# Patient Record
Sex: FEMALE | Race: WHITE | NOT HISPANIC OR LATINO | Employment: OTHER | URBAN - METROPOLITAN AREA
[De-identification: names, ages, dates, MRNs, and addresses within clinical notes are randomized per-mention and may not be internally consistent; named-entity substitution may affect disease eponyms.]

---

## 2019-07-16 ENCOUNTER — OFFICE VISIT (OUTPATIENT)
Dept: ORTHOPEDIC SURGERY | Facility: CLINIC | Age: 64
End: 2019-07-16

## 2019-07-16 VITALS
SYSTOLIC BLOOD PRESSURE: 185 MMHG | DIASTOLIC BLOOD PRESSURE: 83 MMHG | BODY MASS INDEX: 28.52 KG/M2 | HEART RATE: 73 BPM | WEIGHT: 155 LBS | HEIGHT: 62 IN

## 2019-07-16 DIAGNOSIS — M16.11 PRIMARY OSTEOARTHRITIS OF RIGHT HIP: Primary | ICD-10-CM

## 2019-07-16 PROCEDURE — 99204 OFFICE O/P NEW MOD 45 MIN: CPT | Performed by: ORTHOPAEDIC SURGERY

## 2019-07-16 RX ORDER — MELOXICAM 7.5 MG/1
15 TABLET ORAL ONCE
Status: CANCELLED | OUTPATIENT
Start: 2019-07-16 | End: 2019-07-16

## 2019-07-16 RX ORDER — ESCITALOPRAM OXALATE 10 MG/1
10-15 TABLET ORAL DAILY
Refills: 12 | COMMUNITY
Start: 2019-07-01 | End: 2019-11-15 | Stop reason: SDUPTHER

## 2019-07-16 RX ORDER — BIMATOPROST 0.01 %
DROPS OPHTHALMIC (EYE)
Refills: 6 | COMMUNITY
Start: 2019-06-08

## 2019-07-16 RX ORDER — ACETAMINOPHEN 325 MG/1
1000 TABLET ORAL ONCE
Status: CANCELLED | OUTPATIENT
Start: 2019-07-16 | End: 2019-07-16

## 2019-07-16 RX ORDER — PREGABALIN 150 MG/1
150 CAPSULE ORAL ONCE
Status: CANCELLED | OUTPATIENT
Start: 2019-07-16 | End: 2019-07-16

## 2019-07-16 RX ORDER — SODIUM PHOSPHATE,MONO-DIBASIC 19G-7G/118
ENEMA (ML) RECTAL DAILY
COMMUNITY
End: 2020-11-23

## 2019-07-16 RX ORDER — VITAMIN B COMPLEX
1 CAPSULE ORAL 2 TIMES DAILY
COMMUNITY
End: 2020-11-23

## 2019-07-16 RX ORDER — MULTIVIT WITH MINERALS/LUTEIN
400 TABLET ORAL DAILY
COMMUNITY
End: 2019-08-22 | Stop reason: HOSPADM

## 2019-07-16 RX ORDER — FERROUS SULFATE TAB EC 324 MG (65 MG FE EQUIVALENT) 324 (65 FE) MG
324 TABLET DELAYED RESPONSE ORAL
COMMUNITY
End: 2020-11-23

## 2019-07-16 NOTE — PROGRESS NOTES
NEW VISIT    Patient: Savannah Bruner  ?  YOB: 1955    MRN: [id number]  ?  Chief Complaint   Patient presents with   • Right Hip - Pain      ?  HPI: The patient has been having increasing pain and discomfort in her right hip.  She states that she is here for second opinion.  She was seen by another surgeon about a year ago and was thinking about nonoperative management at that point that she was on the floor at [facility] on the next SIPS unit.  She states that by the end of her shift she is in a lot of pain and discomfort.  She is limping quite significantly.  SHe also reports that she has a limb length discrepancy which makes her low back pain worse.  The patient states that she is unable to work because of her hip pain and now she would like to proceed with surgical intervention to improve her quality of life and also the ability to walk for exercise and cardio-vascular conditioning.  Pain Location: Right hip laterally over the greater trochanter and in the groin.  Radiation: From the lateral aspect of the hip down the lateral aspect of the femur  Quality: sharp and stabbing  Intensity/Severity: 8/10  Duration: For 1 year.  Onset quality: gradual   Timing: constant  Aggravating Factors: Walking on a concrete floor and going up and up steps.  Alleviating Factors: Resting the hip and using intact after a medication for the past 6 months or so.  Previous Episodes: yes  Associated Symptoms: pain, decreased ROM, decreased strength  Previous Treatment: Anti-inflammatory medication for the past 6 months and occasionally using a cane.    This patient is a new patient.  This problem is new to this examiner.      Allergies: No Known Allergies    Medications:   Home Medications:  Current Outpatient Medications on File Prior to Visit   Medication Sig   • B Complex Vitamins (VITAMIN B COMPLEX) capsule capsule Take  by mouth Daily.   • ferrous sulfate 324 (65 Fe) MG tablet delayed-release EC tablet Take 324 mg  "by mouth Daily With Breakfast.   • glucosamine sulfate 500 MG capsule capsule Take  by mouth 3 (Three) Times a Day With Meals.   • vitamin E 1000 UNIT capsule Take 1,000 Units by mouth Daily.   • escitalopram (LEXAPRO) 10 MG tablet Take 10 mg by mouth Daily.   • LUMIGAN 0.01 % ophthalmic drops INSTILL 1 DROP INTO EACH EYE AT BEDTIME     No current facility-administered medications on file prior to visit.      Current Medications:  Scheduled Meds:  PRN Meds:.    I have reviewed the patient's medical history in detail and updated the computerized patient record.  Review and summarization of old records include:    Past Medical History:   Diagnosis Date   • Anxiety    • Right hip pain      Past Surgical History:   Procedure Laterality Date   •  SECTION     • HYSTERECTOMY     • TONSILLECTOMY       Social History     Occupational History   • Not on file   Tobacco Use   • Smoking status: Never Smoker   • Smokeless tobacco: Never Used   Substance and Sexual Activity   • Alcohol use: No     Frequency: Never   • Drug use: No   • Sexual activity: Defer      Social History     Social History Narrative   • Not on file     Family History   Problem Relation Age of Onset   • Cancer Mother    • Diabetes Maternal Grandmother    • Heart disease Maternal Grandmother    • Cancer Maternal Grandmother          Review of Systems   Constitutional: Negative.    HENT: Negative.    Eyes: Negative.    Respiratory: Negative.    Cardiovascular: Negative.    Gastrointestinal: Negative.    Endocrine: Negative.    Genitourinary: Negative.    Musculoskeletal: Positive for back pain and gait problem.   Skin: Negative.    Allergic/Immunologic: Negative.    Hematological: Negative.    Psychiatric/Behavioral: Negative.           Wt Readings from Last 3 Encounters:   19 70.3 kg (155 lb)     Ht Readings from Last 3 Encounters:   19 157.5 cm (62\")     Body mass index is 28.35 kg/m².  Facility age limit for growth percentiles is 20 " years.  Vitals:    07/16/19 1107   BP: (!) 185/83   Pulse: 73         Physical Exam   Constitutional: Patient is oriented to person, place, and time. Appears well-developed and well-nourished.   HENT:   Head: Normocephalic and atraumatic.   Eyes: Conjunctivae and EOM are normal. Pupils are equal, round, and reactive to light.   Cardiovascular: Normal rate, regular rhythm, normal heart sounds and intact distal pulses.   Pulmonary/Chest: Effort normal and breath sounds normal.   Musculoskeletal:   See detailed exam below   Neurological: Alert and oriented to person, place, and time. No sensory deficit. Coordination normal.   Skin: Skin is warm and dry. Capillary refill takes less than 2 seconds. No rash noted. No erythema.   Psychiatric: Patient has a normal mood and affect. Her behavior is normal. Judgment and thought content normal.   Nursing note and vitals reviewed.      Ortho Exam:   Right hip (gtb). Skin and soft tissues over the greater trochanteric bursa are painful and tender for the patient. Neurovascular status is intact. IT band is painful and tender. Cross body adduction bothers the patient significantly. Internal and external rotations bother the patient significantly with tenderness over the greater trochanter. There is no clinical deformity. No shortening. The patient does have a significant limp because by the trochanteric pain caused by the abductors. The piriformis is tight and with any rotation there is significant capsular tightness. Dorsalis pedis and posterior tibial artery pulses are palpable. Capillary refill is 2 seconds with a brisk return. Common peroneal nerve function is well preserved.  Right hip (djd). Neurovascular status is intact. Patient has a distant limp on the affected side. Internal and external rotations are associated with pain and discomfort. Anterior joint line pain and tenderness is significant. Stinchfield sign is positive. Figure of 4 sign is positive. Patient is unable  to perform an active straight leg raise exam. Greater trochanter is tender. Crossover adduction test is positive. Cross body adduction is limited and painful for the patient. Patient has very significant limp and joint line tenderness anteriorly, posteriorly and medially. Dorsalis pedis and posterior tibial artery pulses are palpable. Common peroneal nerve function is well preserved.        Diagnostics: It is obtained the hospital are reviewed.  There is a significant tearing of the hip joint space with subchondral cyst formation is noted.  Patient has bone-on-bone appearance.  The joint space is completely obliterated.  There is a loss of height of the acetabulum.  I recommend she is to proceed with total hip arthroplasty are based on medical correlation along with x-ray confirmation of the patient's diagnosis       Assessment:  Savannah was seen today for pain.    Diagnoses and all orders for this visit:    Primary osteoarthritis of right hip          Procedures  ?    Plan    · Use a cane in the opposite hand.  · Tablet meloxicam 7.5 mg tab 1 p.o. nightly for rectal pain and discomfort.  · Rest, ice, compression, and elevation (RICE) therapy  · Stretching and strengthening exercises of the flexors and abductors including Ativan.  · Recent benefits of hip replacement surgery discussed with the patient and her  in great detail.  · Time spent in the office today with the patient is 45 minutes of which greater than 50% of my time was spent face-to-face with the patient going through the treatment options and the potential comp occasions of anterior approach hip replacement surgery.  The patient was seen today for preoperative discussion.  The patient has been tried on over-the-counter and prescription NSAID's despite the risks of anti-inflammatory bleeding, peptic ulcers and erosive gastritis with short term benefit only.  Braces have been prescribed for mechanical support.  Patient has been participating in an  exercise program specifically targeting joint pain relief with limited benefit. Intraarticular injections have been used periodically with some but not complete relief of pain.  Ambulation aids have also been utilized.      · The details of the surgical procedure were explained including the location of probable incisions and a description of the likely hardware/grafts to be used. The patient understands the likely convalescence after surgery as well as the rehabilitation required.  Also, we have thoroughly discussed with the patient the risks, benefits and alternatives to surgery.  Risks include but are not limited to the risk of infection, joint stiffness, limited range of motion, wound healing problems, scar tissue build up, myocardial infarction, stroke, blood clots (including DVT and/or pulmonary embolus along with the risk of death) neurologic and/or vascular injury, limb length discrepancy, fracture, dislocation, nonunion, malunion, continued pain and need for further surgery including hardware failure requiring revision.   · OTC Tylenol 500-1000mg by mouth every 6 hours as needed for pain   · Follow up in 6 week(s)    Daniel Tolentino MD  07/16/2019

## 2019-08-05 ENCOUNTER — HOSPITAL ENCOUNTER (OUTPATIENT)
Dept: GENERAL RADIOLOGY | Facility: HOSPITAL | Age: 64
Discharge: HOME | End: 2019-08-05

## 2019-08-05 ENCOUNTER — APPOINTMENT (OUTPATIENT)
Dept: PREADMISSION TESTING | Facility: HOSPITAL | Age: 64
End: 2019-08-05

## 2019-08-05 ENCOUNTER — HOSPITAL ENCOUNTER (OUTPATIENT)
Dept: GENERAL RADIOLOGY | Facility: HOSPITAL | Age: 64
Discharge: HOME | End: 2019-08-05
Admitting: ORTHOPAEDIC SURGERY

## 2019-08-05 DIAGNOSIS — M16.11 PRIMARY OSTEOARTHRITIS OF RIGHT HIP: ICD-10-CM

## 2019-08-05 PROCEDURE — 93005 ELECTROCARDIOGRAM TRACING: CPT

## 2019-08-05 PROCEDURE — 73501 X-RAY EXAM HIP UNI 1 VIEW: CPT

## 2019-08-05 PROCEDURE — 71046 X-RAY EXAM CHEST 2 VIEWS: CPT

## 2019-08-05 RX ORDER — IBUPROFEN 400 MG/1
400 TABLET ORAL AS NEEDED
COMMUNITY
End: 2019-08-22 | Stop reason: HOSPADM

## 2019-08-05 ASSESSMENT — HOOS JR
HOOS JR SCORE: 15
HOOS JR SCORE: 43.335

## 2019-08-10 PROCEDURE — 93010 ELECTROCARDIOGRAM REPORT: CPT | Performed by: INTERNAL MEDICINE

## 2019-08-13 ENCOUNTER — LAB (OUTPATIENT)
Dept: LAB | Facility: HOSPITAL | Age: 64
End: 2019-08-13

## 2019-08-13 LAB
ABO GROUP BLD: NORMAL
ANION GAP SERPL CALCULATED.3IONS-SCNC: 13.9 MMOL/L (ref 5–15)
APTT PPP: 26.3 SECONDS (ref 24–31)
BACTERIA UR QL AUTO: ABNORMAL /HPF
BASOPHILS # BLD AUTO: 0 10*3/MM3 (ref 0–0.2)
BASOPHILS NFR BLD AUTO: 0.6 % (ref 0–1.5)
BILIRUB UR QL STRIP: NEGATIVE
BLD GP AB SCN SERPL QL: NEGATIVE
BUN BLD-MCNC: 12 MG/DL (ref 8–20)
BUN/CREAT SERPL: 17.1 (ref 5.4–26.2)
CALCIUM SPEC-SCNC: 9.6 MG/DL (ref 8.9–10.3)
CHLORIDE SERPL-SCNC: 103 MMOL/L (ref 101–111)
CLARITY UR: CLEAR
CO2 SERPL-SCNC: 28 MMOL/L (ref 22–32)
COLOR UR: YELLOW
CREAT BLD-MCNC: 0.7 MG/DL (ref 0.4–1)
DEPRECATED RDW RBC AUTO: 41.6 FL (ref 37–54)
EOSINOPHIL # BLD AUTO: 0.1 10*3/MM3 (ref 0–0.4)
EOSINOPHIL NFR BLD AUTO: 1.3 % (ref 0.3–6.2)
ERYTHROCYTE [DISTWIDTH] IN BLOOD BY AUTOMATED COUNT: 13.3 % (ref 12.3–15.4)
GFR SERPL CREATININE-BSD FRML MDRD: 84 ML/MIN/1.73
GLUCOSE BLD-MCNC: 100 MG/DL (ref 65–99)
GLUCOSE UR STRIP-MCNC: NEGATIVE MG/DL
HCT VFR BLD AUTO: 36.8 % (ref 34–46.6)
HGB BLD-MCNC: 12.3 G/DL (ref 12–15.9)
HGB UR QL STRIP.AUTO: NEGATIVE
HYALINE CASTS UR QL AUTO: ABNORMAL /LPF
INR PPP: 0.94 (ref 0.9–1.1)
KETONES UR QL STRIP: NEGATIVE
LEUKOCYTE ESTERASE UR QL STRIP.AUTO: ABNORMAL
LYMPHOCYTES # BLD AUTO: 1 10*3/MM3 (ref 0.7–3.1)
LYMPHOCYTES NFR BLD AUTO: 19.7 % (ref 19.6–45.3)
MCH RBC QN AUTO: 29.5 PG (ref 26.6–33)
MCHC RBC AUTO-ENTMCNC: 33.5 G/DL (ref 31.5–35.7)
MCV RBC AUTO: 88 FL (ref 79–97)
MONOCYTES # BLD AUTO: 0.3 10*3/MM3 (ref 0.1–0.9)
MONOCYTES NFR BLD AUTO: 6 % (ref 5–12)
NEUTROPHILS # BLD AUTO: 3.6 10*3/MM3 (ref 1.7–7)
NEUTROPHILS NFR BLD AUTO: 72.4 % (ref 42.7–76)
NITRITE UR QL STRIP: NEGATIVE
NRBC BLD AUTO-RTO: 0.1 /100 WBC (ref 0–0.2)
PH UR STRIP.AUTO: 7.5 [PH] (ref 5–8)
PLATELET # BLD AUTO: 232 10*3/MM3 (ref 140–450)
PMV BLD AUTO: 9.5 FL (ref 6–12)
POTASSIUM BLD-SCNC: 3.9 MMOL/L (ref 3.6–5.1)
PROT UR QL STRIP: NEGATIVE
PROTHROMBIN TIME: 9.8 SECONDS (ref 9.6–11.7)
RBC # BLD AUTO: 4.18 10*6/MM3 (ref 3.77–5.28)
RBC # UR: ABNORMAL /HPF
REF LAB TEST METHOD: ABNORMAL
RH BLD: POSITIVE
SODIUM BLD-SCNC: 141 MMOL/L (ref 136–144)
SP GR UR STRIP: 1.01 (ref 1–1.03)
SQUAMOUS #/AREA URNS HPF: ABNORMAL /HPF
T&S EXPIRATION DATE: NORMAL
UROBILINOGEN UR QL STRIP: ABNORMAL
WBC NRBC COR # BLD: 5 10*3/MM3 (ref 3.4–10.8)
WBC UR QL AUTO: ABNORMAL /HPF

## 2019-08-13 PROCEDURE — 85025 COMPLETE CBC W/AUTO DIFF WBC: CPT | Performed by: ORTHOPAEDIC SURGERY

## 2019-08-13 PROCEDURE — 85730 THROMBOPLASTIN TIME PARTIAL: CPT | Performed by: ORTHOPAEDIC SURGERY

## 2019-08-13 PROCEDURE — 80048 BASIC METABOLIC PNL TOTAL CA: CPT | Performed by: ORTHOPAEDIC SURGERY

## 2019-08-13 PROCEDURE — 86900 BLOOD TYPING SEROLOGIC ABO: CPT | Performed by: ORTHOPAEDIC SURGERY

## 2019-08-13 PROCEDURE — 86900 BLOOD TYPING SEROLOGIC ABO: CPT

## 2019-08-13 PROCEDURE — 81001 URINALYSIS AUTO W/SCOPE: CPT

## 2019-08-13 PROCEDURE — 86901 BLOOD TYPING SEROLOGIC RH(D): CPT

## 2019-08-13 PROCEDURE — 87081 CULTURE SCREEN ONLY: CPT | Performed by: ORTHOPAEDIC SURGERY

## 2019-08-13 PROCEDURE — 85610 PROTHROMBIN TIME: CPT | Performed by: ORTHOPAEDIC SURGERY

## 2019-08-13 PROCEDURE — 86901 BLOOD TYPING SEROLOGIC RH(D): CPT | Performed by: ORTHOPAEDIC SURGERY

## 2019-08-13 PROCEDURE — 36415 COLL VENOUS BLD VENIPUNCTURE: CPT

## 2019-08-13 PROCEDURE — 86850 RBC ANTIBODY SCREEN: CPT | Performed by: ORTHOPAEDIC SURGERY

## 2019-08-14 LAB — MRSA SPEC QL CULT: NORMAL

## 2019-08-15 ENCOUNTER — OFFICE VISIT (OUTPATIENT)
Dept: FAMILY MEDICINE CLINIC | Facility: CLINIC | Age: 64
End: 2019-08-15

## 2019-08-15 VITALS
SYSTOLIC BLOOD PRESSURE: 128 MMHG | HEIGHT: 62 IN | BODY MASS INDEX: 26.68 KG/M2 | TEMPERATURE: 98.2 F | WEIGHT: 145 LBS | HEART RATE: 87 BPM | RESPIRATION RATE: 18 BRPM | DIASTOLIC BLOOD PRESSURE: 82 MMHG | OXYGEN SATURATION: 99 %

## 2019-08-15 DIAGNOSIS — Z01.818 ENCOUNTER FOR PREOPERATIVE EXAMINATION FOR GENERAL SURGICAL PROCEDURE: Primary | ICD-10-CM

## 2019-08-15 PROCEDURE — 99396 PREV VISIT EST AGE 40-64: CPT | Performed by: FAMILY MEDICINE

## 2019-08-15 NOTE — PROGRESS NOTES
Subjective   Savannah Bruner is a 64 y.o. female.      is here today for surgery clearance. Scheduled to have total right hip placed on 8/21/19. Seeing .          The following portions of the patient's history were reviewed and updated as appropriate: allergies, current medications, past family history, past medical history, past social history, past surgical history and problem list.    Patient Active Problem List   Diagnosis   • Primary osteoarthritis of right hip   • Polyarthropathy or polyarthritis   • Anxiety   • Chronic angle-closure glaucoma   • Depressive disorder   • Hyperlipidemia   • Hypertension, benign       Current Outpatient Medications on File Prior to Visit   Medication Sig Dispense Refill   • escitalopram (LEXAPRO) 10 MG tablet Take 10-15 mg by mouth Daily.  12   • Ferrous Sulfate (IRON) 28 MG tablet Take  by mouth Daily.     • B Complex Vitamins (VITAMIN B COMPLEX) capsule capsule Take 1 capsule by mouth 2 (Two) Times a Day.     • ferrous sulfate 324 (65 Fe) MG tablet delayed-release EC tablet Take 324 mg by mouth Daily With Breakfast.     • glucosamine sulfate 500 MG capsule capsule Take  by mouth Daily.     • ibuprofen (ADVIL,MOTRIN) 400 MG tablet Take 400 mg by mouth As Needed for Mild Pain .     • LUMIGAN 0.01 % ophthalmic drops INSTILL 1 DROP INTO EACH EYE daily  6   • vitamin E 1000 UNIT capsule Take 400 Units by mouth Daily.       No current facility-administered medications on file prior to visit.        Allergies   Allergen Reactions   • Latex Itching     Hands itch not diagnosed       Review of Systems   Constitutional: Negative for activity change, appetite change, fatigue and fever.   HENT: Negative for ear pain, swollen glands and voice change.    Eyes: Negative for visual disturbance.   Respiratory: Negative for shortness of breath and wheezing.    Cardiovascular: Negative for chest pain and leg swelling.   Gastrointestinal: Negative for abdominal pain, blood in  stool, constipation, diarrhea, nausea and vomiting.   Endocrine: Negative for polydipsia and polyuria.   Genitourinary: Negative for dysuria, frequency and hematuria.   Musculoskeletal: Negative for joint swelling, neck pain and neck stiffness.   Skin: Negative for rash and bruise.   Neurological: Negative for weakness, numbness and headache.   Psychiatric/Behavioral: Negative for suicidal ideas and depressed mood.       Objective   Physical Exam   Constitutional: She is oriented to person, place, and time. She appears well-developed and well-nourished.   HENT:   Head: Normocephalic and atraumatic.   Left Ear: External ear normal.   Nose: Nose normal.   Mouth/Throat: Oropharynx is clear and moist.   Eyes: EOM are normal. Pupils are equal, round, and reactive to light.   Neck: Normal range of motion. Neck supple.   Cardiovascular: Normal rate, regular rhythm and normal heart sounds.   Pulmonary/Chest: Effort normal.   Abdominal: Soft. Bowel sounds are normal.   Musculoskeletal: Normal range of motion.   Neurological: She is alert and oriented to person, place, and time.   Skin: Skin is warm and dry.   Psychiatric: She has a normal mood and affect. Her behavior is normal. Judgment and thought content normal.         Assessment/Plan .  Problem List Items Addressed This Visit     None      Visit Diagnoses     Encounter for preoperative examination for general surgical procedure    -  Primary    Cleared, pt low risk.

## 2019-08-20 ENCOUNTER — ANESTHESIA EVENT (OUTPATIENT)
Dept: PERIOP | Facility: HOSPITAL | Age: 64
End: 2019-08-20

## 2019-08-21 ENCOUNTER — ANESTHESIA (OUTPATIENT)
Dept: PERIOP | Facility: HOSPITAL | Age: 64
End: 2019-08-21

## 2019-08-21 ENCOUNTER — APPOINTMENT (OUTPATIENT)
Dept: GENERAL RADIOLOGY | Facility: HOSPITAL | Age: 64
End: 2019-08-21

## 2019-08-21 ENCOUNTER — HOSPITAL ENCOUNTER (INPATIENT)
Facility: HOSPITAL | Age: 64
LOS: 1 days | Discharge: HOME | End: 2019-08-22
Attending: ORTHOPAEDIC SURGERY | Admitting: ORTHOPAEDIC SURGERY

## 2019-08-21 DIAGNOSIS — M16.11 PRIMARY OSTEOARTHRITIS OF RIGHT HIP: ICD-10-CM

## 2019-08-21 PROBLEM — M17.11 PRIMARY OSTEOARTHRITIS OF RIGHT KNEE: Status: ACTIVE | Noted: 2019-08-21

## 2019-08-21 PROBLEM — E66.3 OVERWEIGHT (BMI 25.0-29.9): Status: ACTIVE | Noted: 2019-08-21

## 2019-08-21 PROCEDURE — 25010000002 FENTANYL CITRATE (PF) 100 MCG/2ML SOLUTION: Performed by: ANESTHESIOLOGY

## 2019-08-21 PROCEDURE — 27130 TOTAL HIP ARTHROPLASTY: CPT | Performed by: ORTHOPAEDIC SURGERY

## 2019-08-21 PROCEDURE — C9290 INJ, BUPIVACAINE LIPOSOME: HCPCS | Performed by: ORTHOPAEDIC SURGERY

## 2019-08-21 PROCEDURE — 25010000002 CEFAZOLIN PER 500 MG: Performed by: ANESTHESIOLOGY

## 2019-08-21 PROCEDURE — 97162 PT EVAL MOD COMPLEX 30 MIN: CPT

## 2019-08-21 PROCEDURE — 25010000002 CEFAZOLIN PER 500 MG: Performed by: PHYSICIAN ASSISTANT

## 2019-08-21 PROCEDURE — C1776 JOINT DEVICE (IMPLANTABLE): HCPCS | Performed by: ORTHOPAEDIC SURGERY

## 2019-08-21 PROCEDURE — 25010000002 PROPOFOL 200 MG/20ML EMULSION: Performed by: ANESTHESIOLOGY

## 2019-08-21 PROCEDURE — 25010000002 ROPIVACAINE PER 1 MG: Performed by: ORTHOPAEDIC SURGERY

## 2019-08-21 PROCEDURE — 73501 X-RAY EXAM HIP UNI 1 VIEW: CPT

## 2019-08-21 PROCEDURE — 25010000002 SUCCINYLCHOLINE PER 20 MG: Performed by: ANESTHESIOLOGY

## 2019-08-21 PROCEDURE — 25010000003 BUPIVACAINE LIPOSOME 1.3 % SUSPENSION: Performed by: ORTHOPAEDIC SURGERY

## 2019-08-21 PROCEDURE — 25010000002 HYDROMORPHONE PER 4 MG: Performed by: ANESTHESIOLOGY

## 2019-08-21 PROCEDURE — 25010000002 PHENYLEPHRINE 10 MG/ML SOLUTION: Performed by: ANESTHESIOLOGY

## 2019-08-21 PROCEDURE — 72170 X-RAY EXAM OF PELVIS: CPT | Performed by: PHYSICIAN ASSISTANT

## 2019-08-21 PROCEDURE — 25010000002 NEOSTIGMINE 10 MG/10ML SOLUTION: Performed by: ANESTHESIOLOGY

## 2019-08-21 PROCEDURE — 25010000002 ONDANSETRON PER 1 MG: Performed by: PHYSICIAN ASSISTANT

## 2019-08-21 PROCEDURE — C1713 ANCHOR/SCREW BN/BN,TIS/BN: HCPCS | Performed by: ORTHOPAEDIC SURGERY

## 2019-08-21 PROCEDURE — 76000 FLUOROSCOPY <1 HR PHYS/QHP: CPT

## 2019-08-21 PROCEDURE — S0260 H&P FOR SURGERY: HCPCS | Performed by: ORTHOPAEDIC SURGERY

## 2019-08-21 PROCEDURE — 25010000002 ONDANSETRON PER 1 MG: Performed by: ANESTHESIOLOGY

## 2019-08-21 PROCEDURE — 25010000002 DEXAMETHASONE PER 1 MG: Performed by: ANESTHESIOLOGY

## 2019-08-21 PROCEDURE — 97116 GAIT TRAINING THERAPY: CPT

## 2019-08-21 PROCEDURE — 0SR904A REPLACEMENT OF RIGHT HIP JOINT WITH CERAMIC ON POLYETHYLENE SYNTHETIC SUBSTITUTE, UNCEMENTED, OPEN APPROACH: ICD-10-PCS | Performed by: ORTHOPAEDIC SURGERY

## 2019-08-21 DEVICE — BIOLOX® DELTA, CERAMIC FEMORAL HEAD, S, Ø 32/-3.5, TAPER 12/14
Type: IMPLANTABLE DEVICE | Site: HIP | Status: FUNCTIONAL
Brand: BIOLOX® DELTA

## 2019-08-21 DEVICE — IMPLANTABLE DEVICE: Type: IMPLANTABLE DEVICE | Site: HIP | Status: FUNCTIONAL

## 2019-08-21 DEVICE — SCRW ACET CORT TRILOGY S/TAP 6.5X20: Type: IMPLANTABLE DEVICE | Site: HIP | Status: FUNCTIONAL

## 2019-08-21 DEVICE — TOTAL HIP PRIMARY: Type: IMPLANTABLE DEVICE | Site: HIP | Status: FUNCTIONAL

## 2019-08-21 DEVICE — AVENIR MÜLLER STEM 2 STANDARD
Type: IMPLANTABLE DEVICE | Site: HIP | Status: FUNCTIONAL
Brand: AVENIR® MÜLLER

## 2019-08-21 DEVICE — CAP HIP TM UPCHRG: Type: IMPLANTABLE DEVICE | Site: HIP | Status: FUNCTIONAL

## 2019-08-21 DEVICE — CAP HIP VE UPCHRG: Type: IMPLANTABLE DEVICE | Site: HIP | Status: FUNCTIONAL

## 2019-08-21 DEVICE — CAP CERAM HD HIP UPCHRG: Type: IMPLANTABLE DEVICE | Site: HIP | Status: FUNCTIONAL

## 2019-08-21 RX ORDER — OXYCODONE HCL 10 MG/1
10 TABLET, FILM COATED, EXTENDED RELEASE ORAL EVERY 12 HOURS SCHEDULED
Status: DISCONTINUED | OUTPATIENT
Start: 2019-08-21 | End: 2019-08-21 | Stop reason: HOSPADM

## 2019-08-21 RX ORDER — OXYCODONE HYDROCHLORIDE 5 MG/1
5 TABLET ORAL EVERY 4 HOURS PRN
Status: DISCONTINUED | OUTPATIENT
Start: 2019-08-21 | End: 2019-08-22 | Stop reason: HOSPADM

## 2019-08-21 RX ORDER — DIPHENHYDRAMINE HCL 25 MG
25 TABLET ORAL EVERY 6 HOURS PRN
Status: DISCONTINUED | OUTPATIENT
Start: 2019-08-21 | End: 2019-08-22 | Stop reason: HOSPADM

## 2019-08-21 RX ORDER — FERROUS SULFATE TAB EC 324 MG (65 MG FE EQUIVALENT) 324 (65 FE) MG
324 TABLET DELAYED RESPONSE ORAL
Status: DISCONTINUED | OUTPATIENT
Start: 2019-08-22 | End: 2019-08-22 | Stop reason: HOSPADM

## 2019-08-21 RX ORDER — TRAMADOL HYDROCHLORIDE 50 MG/1
50 TABLET ORAL EVERY 6 HOURS PRN
Status: DISCONTINUED | OUTPATIENT
Start: 2019-08-21 | End: 2019-08-22 | Stop reason: HOSPADM

## 2019-08-21 RX ORDER — ROCURONIUM BROMIDE 10 MG/ML
INJECTION, SOLUTION INTRAVENOUS AS NEEDED
Status: DISCONTINUED | OUTPATIENT
Start: 2019-08-21 | End: 2019-08-21 | Stop reason: SURG

## 2019-08-21 RX ORDER — SODIUM CHLORIDE 0.9 % (FLUSH) 0.9 %
3-10 SYRINGE (ML) INJECTION AS NEEDED
Status: DISCONTINUED | OUTPATIENT
Start: 2019-08-21 | End: 2019-08-21 | Stop reason: HOSPADM

## 2019-08-21 RX ORDER — PROPOFOL 10 MG/ML
INJECTION, EMULSION INTRAVENOUS AS NEEDED
Status: DISCONTINUED | OUTPATIENT
Start: 2019-08-21 | End: 2019-08-21 | Stop reason: SURG

## 2019-08-21 RX ORDER — SODIUM CHLORIDE 0.9 % (FLUSH) 0.9 %
3 SYRINGE (ML) INJECTION EVERY 12 HOURS SCHEDULED
Status: DISCONTINUED | OUTPATIENT
Start: 2019-08-21 | End: 2019-08-21 | Stop reason: HOSPADM

## 2019-08-21 RX ORDER — MELOXICAM 15 MG/1
15 TABLET ORAL ONCE
Status: COMPLETED | OUTPATIENT
Start: 2019-08-21 | End: 2019-08-21

## 2019-08-21 RX ORDER — NEOSTIGMINE METHYLSULFATE 1 MG/ML
INJECTION, SOLUTION INTRAVENOUS AS NEEDED
Status: DISCONTINUED | OUTPATIENT
Start: 2019-08-21 | End: 2019-08-21 | Stop reason: SURG

## 2019-08-21 RX ORDER — TRANEXAMIC ACID 100 MG/ML
INJECTION, SOLUTION INTRAVENOUS AS NEEDED
Status: DISCONTINUED | OUTPATIENT
Start: 2019-08-21 | End: 2019-08-21 | Stop reason: SURG

## 2019-08-21 RX ORDER — NALOXONE HCL 0.4 MG/ML
0.4 VIAL (ML) INJECTION
Status: DISCONTINUED | OUTPATIENT
Start: 2019-08-21 | End: 2019-08-22 | Stop reason: HOSPADM

## 2019-08-21 RX ORDER — LIDOCAINE HYDROCHLORIDE 10 MG/ML
INJECTION, SOLUTION EPIDURAL; INFILTRATION; INTRACAUDAL; PERINEURAL AS NEEDED
Status: DISCONTINUED | OUTPATIENT
Start: 2019-08-21 | End: 2019-08-21 | Stop reason: SURG

## 2019-08-21 RX ORDER — ONDANSETRON 4 MG/1
4 TABLET, FILM COATED ORAL EVERY 6 HOURS PRN
Status: DISCONTINUED | OUTPATIENT
Start: 2019-08-21 | End: 2019-08-22 | Stop reason: HOSPADM

## 2019-08-21 RX ORDER — ACETAMINOPHEN 500 MG
1000 TABLET ORAL EVERY 8 HOURS
Status: DISCONTINUED | OUTPATIENT
Start: 2019-08-21 | End: 2019-08-22 | Stop reason: HOSPADM

## 2019-08-21 RX ORDER — PROMETHAZINE HYDROCHLORIDE 25 MG/1
12.5 TABLET ORAL EVERY 6 HOURS PRN
Status: DISCONTINUED | OUTPATIENT
Start: 2019-08-21 | End: 2019-08-22 | Stop reason: HOSPADM

## 2019-08-21 RX ORDER — FENTANYL CITRATE 50 UG/ML
INJECTION, SOLUTION INTRAMUSCULAR; INTRAVENOUS AS NEEDED
Status: DISCONTINUED | OUTPATIENT
Start: 2019-08-21 | End: 2019-08-21 | Stop reason: SURG

## 2019-08-21 RX ORDER — HYDROMORPHONE HCL 110MG/55ML
0.5 PATIENT CONTROLLED ANALGESIA SYRINGE INTRAVENOUS
Status: DISCONTINUED | OUTPATIENT
Start: 2019-08-21 | End: 2019-08-21 | Stop reason: HOSPADM

## 2019-08-21 RX ORDER — SODIUM CHLORIDE 9 MG/ML
100 INJECTION, SOLUTION INTRAVENOUS CONTINUOUS
Status: DISCONTINUED | OUTPATIENT
Start: 2019-08-21 | End: 2019-08-22 | Stop reason: HOSPADM

## 2019-08-21 RX ORDER — ESCITALOPRAM OXALATE 10 MG/1
10 TABLET ORAL DAILY
Status: DISCONTINUED | OUTPATIENT
Start: 2019-08-21 | End: 2019-08-22 | Stop reason: HOSPADM

## 2019-08-21 RX ORDER — SODIUM CHLORIDE, SODIUM LACTATE, POTASSIUM CHLORIDE, CALCIUM CHLORIDE 600; 310; 30; 20 MG/100ML; MG/100ML; MG/100ML; MG/100ML
9 INJECTION, SOLUTION INTRAVENOUS CONTINUOUS PRN
Status: DISCONTINUED | OUTPATIENT
Start: 2019-08-21 | End: 2019-08-21 | Stop reason: HOSPADM

## 2019-08-21 RX ORDER — ONDANSETRON 2 MG/ML
4 INJECTION INTRAMUSCULAR; INTRAVENOUS EVERY 6 HOURS PRN
Status: DISCONTINUED | OUTPATIENT
Start: 2019-08-21 | End: 2019-08-22 | Stop reason: HOSPADM

## 2019-08-21 RX ORDER — SUCCINYLCHOLINE CHLORIDE 20 MG/ML
INJECTION INTRAMUSCULAR; INTRAVENOUS AS NEEDED
Status: DISCONTINUED | OUTPATIENT
Start: 2019-08-21 | End: 2019-08-21 | Stop reason: SURG

## 2019-08-21 RX ORDER — GABAPENTIN 300 MG/1
600 CAPSULE ORAL
Status: COMPLETED | OUTPATIENT
Start: 2019-08-21 | End: 2019-08-21

## 2019-08-21 RX ORDER — ROPIVACAINE HYDROCHLORIDE 5 MG/ML
INJECTION, SOLUTION EPIDURAL; INFILTRATION; PERINEURAL AS NEEDED
Status: DISCONTINUED | OUTPATIENT
Start: 2019-08-21 | End: 2019-08-21 | Stop reason: HOSPADM

## 2019-08-21 RX ORDER — ACETAMINOPHEN 500 MG
1000 TABLET ORAL ONCE
Status: COMPLETED | OUTPATIENT
Start: 2019-08-21 | End: 2019-08-21

## 2019-08-21 RX ORDER — LATANOPROST 50 UG/ML
1 SOLUTION/ DROPS OPHTHALMIC NIGHTLY
Status: DISCONTINUED | OUTPATIENT
Start: 2019-08-21 | End: 2019-08-22 | Stop reason: HOSPADM

## 2019-08-21 RX ORDER — PHENYLEPHRINE HYDROCHLORIDE 10 MG/ML
INJECTION INTRAVENOUS AS NEEDED
Status: DISCONTINUED | OUTPATIENT
Start: 2019-08-21 | End: 2019-08-21 | Stop reason: SURG

## 2019-08-21 RX ORDER — ONDANSETRON 2 MG/ML
INJECTION INTRAMUSCULAR; INTRAVENOUS AS NEEDED
Status: DISCONTINUED | OUTPATIENT
Start: 2019-08-21 | End: 2019-08-21 | Stop reason: SURG

## 2019-08-21 RX ORDER — BISACODYL 10 MG
10 SUPPOSITORY, RECTAL RECTAL DAILY PRN
Status: DISCONTINUED | OUTPATIENT
Start: 2019-08-21 | End: 2019-08-22 | Stop reason: HOSPADM

## 2019-08-21 RX ORDER — GLYCOPYRROLATE 0.2 MG/ML
INJECTION INTRAMUSCULAR; INTRAVENOUS AS NEEDED
Status: DISCONTINUED | OUTPATIENT
Start: 2019-08-21 | End: 2019-08-21 | Stop reason: SURG

## 2019-08-21 RX ORDER — DIPHENHYDRAMINE HYDROCHLORIDE 50 MG/ML
25 INJECTION INTRAMUSCULAR; INTRAVENOUS EVERY 6 HOURS PRN
Status: DISCONTINUED | OUTPATIENT
Start: 2019-08-21 | End: 2019-08-22 | Stop reason: HOSPADM

## 2019-08-21 RX ORDER — GABAPENTIN 300 MG/1
300 CAPSULE ORAL NIGHTLY
Status: DISCONTINUED | OUTPATIENT
Start: 2019-08-21 | End: 2019-08-22 | Stop reason: HOSPADM

## 2019-08-21 RX ORDER — SODIUM CHLORIDE, SODIUM LACTATE, POTASSIUM CHLORIDE, CALCIUM CHLORIDE 600; 310; 30; 20 MG/100ML; MG/100ML; MG/100ML; MG/100ML
9 INJECTION, SOLUTION INTRAVENOUS CONTINUOUS PRN
Status: DISCONTINUED | OUTPATIENT
Start: 2019-08-21 | End: 2019-08-21 | Stop reason: SDUPTHER

## 2019-08-21 RX ORDER — MORPHINE SULFATE 4 MG/ML
4 INJECTION, SOLUTION INTRAMUSCULAR; INTRAVENOUS
Status: DISCONTINUED | OUTPATIENT
Start: 2019-08-21 | End: 2019-08-22 | Stop reason: HOSPADM

## 2019-08-21 RX ORDER — OXYCODONE HYDROCHLORIDE 5 MG/1
10 TABLET ORAL EVERY 4 HOURS PRN
Status: DISCONTINUED | OUTPATIENT
Start: 2019-08-21 | End: 2019-08-22 | Stop reason: HOSPADM

## 2019-08-21 RX ORDER — MELOXICAM 15 MG/1
15 TABLET ORAL DAILY
Status: DISCONTINUED | OUTPATIENT
Start: 2019-08-22 | End: 2019-08-22 | Stop reason: HOSPADM

## 2019-08-21 RX ORDER — DOCUSATE SODIUM 100 MG/1
100 CAPSULE, LIQUID FILLED ORAL 2 TIMES DAILY
Status: DISCONTINUED | OUTPATIENT
Start: 2019-08-21 | End: 2019-08-22 | Stop reason: HOSPADM

## 2019-08-21 RX ADMIN — CEFAZOLIN SODIUM 2 G: 10 INJECTION, POWDER, FOR SOLUTION INTRAVENOUS at 19:31

## 2019-08-21 RX ADMIN — DOCUSATE SODIUM 100 MG: 100 CAPSULE, LIQUID FILLED ORAL at 21:52

## 2019-08-21 RX ADMIN — FENTANYL CITRATE 75 MCG: 50 INJECTION, SOLUTION INTRAMUSCULAR; INTRAVENOUS at 10:48

## 2019-08-21 RX ADMIN — NEOSTIGMINE METHYLSULFATE 3 MG: 1 INJECTION, SOLUTION INTRAVENOUS at 11:42

## 2019-08-21 RX ADMIN — SODIUM CHLORIDE 500 ML: 900 INJECTION, SOLUTION INTRAVENOUS at 08:16

## 2019-08-21 RX ADMIN — SODIUM CHLORIDE, SODIUM LACTATE, POTASSIUM CHLORIDE, AND CALCIUM CHLORIDE 9 ML/HR: 600; 310; 30; 20 INJECTION, SOLUTION INTRAVENOUS at 09:45

## 2019-08-21 RX ADMIN — SUCCINYLCHOLINE CHLORIDE 100 MG: 20 INJECTION, SOLUTION INTRAMUSCULAR; INTRAVENOUS at 10:11

## 2019-08-21 RX ADMIN — CEFAZOLIN SODIUM 2 G: 10 INJECTION, POWDER, FOR SOLUTION INTRAVENOUS at 10:15

## 2019-08-21 RX ADMIN — FENTANYL CITRATE 125 MCG: 50 INJECTION, SOLUTION INTRAMUSCULAR; INTRAVENOUS at 10:11

## 2019-08-21 RX ADMIN — RIVAROXABAN 10 MG: 10 TABLET, FILM COATED ORAL at 19:31

## 2019-08-21 RX ADMIN — PHENYLEPHRINE HYDROCHLORIDE 100 MCG: 10 INJECTION INTRAVENOUS at 11:31

## 2019-08-21 RX ADMIN — ONDANSETRON 4 MG: 2 INJECTION INTRAMUSCULAR; INTRAVENOUS at 10:25

## 2019-08-21 RX ADMIN — PHENYLEPHRINE HYDROCHLORIDE 100 MCG: 10 INJECTION INTRAVENOUS at 11:19

## 2019-08-21 RX ADMIN — OXYCODONE HYDROCHLORIDE 10 MG: 10 TABLET, FILM COATED, EXTENDED RELEASE ORAL at 08:15

## 2019-08-21 RX ADMIN — LIDOCAINE HYDROCHLORIDE 50 MG: 10 INJECTION, SOLUTION EPIDURAL; INFILTRATION; INTRACAUDAL; PERINEURAL at 10:11

## 2019-08-21 RX ADMIN — Medication 10 MG: at 10:25

## 2019-08-21 RX ADMIN — HYDROMORPHONE HYDROCHLORIDE 0.5 MG: 2 INJECTION, SOLUTION INTRAMUSCULAR; INTRAVENOUS; SUBCUTANEOUS at 12:51

## 2019-08-21 RX ADMIN — PHENYLEPHRINE HYDROCHLORIDE 200 MCG: 10 INJECTION INTRAVENOUS at 10:27

## 2019-08-21 RX ADMIN — ESCITALOPRAM OXALATE 10 MG: 10 TABLET ORAL at 17:04

## 2019-08-21 RX ADMIN — MELOXICAM 15 MG: 15 TABLET ORAL at 08:16

## 2019-08-21 RX ADMIN — PHENYLEPHRINE HYDROCHLORIDE 200 MCG: 10 INJECTION INTRAVENOUS at 10:37

## 2019-08-21 RX ADMIN — SODIUM CHLORIDE 100 ML/HR: 900 INJECTION, SOLUTION INTRAVENOUS at 17:12

## 2019-08-21 RX ADMIN — PROPOFOL 50 MG: 10 INJECTION, EMULSION INTRAVENOUS at 11:23

## 2019-08-21 RX ADMIN — ACETAMINOPHEN 1000 MG: 500 TABLET, FILM COATED ORAL at 08:16

## 2019-08-21 RX ADMIN — ROCURONIUM BROMIDE 5 MG: 10 INJECTION, SOLUTION INTRAVENOUS at 10:11

## 2019-08-21 RX ADMIN — TRANEXAMIC ACID 1000 MG: 100 INJECTION, SOLUTION INTRAVENOUS at 10:15

## 2019-08-21 RX ADMIN — ROCURONIUM BROMIDE 20 MG: 10 INJECTION, SOLUTION INTRAVENOUS at 10:21

## 2019-08-21 RX ADMIN — OXYCODONE HYDROCHLORIDE 5 MG: 5 TABLET ORAL at 21:52

## 2019-08-21 RX ADMIN — ROCURONIUM BROMIDE 10 MG: 10 INJECTION, SOLUTION INTRAVENOUS at 11:27

## 2019-08-21 RX ADMIN — GABAPENTIN 300 MG: 300 CAPSULE ORAL at 21:52

## 2019-08-21 RX ADMIN — ONDANSETRON 4 MG: 2 INJECTION INTRAMUSCULAR; INTRAVENOUS at 16:17

## 2019-08-21 RX ADMIN — POLYETHYLENE GLYCOL 3350 17 G: 17 POWDER, FOR SOLUTION ORAL at 17:04

## 2019-08-21 RX ADMIN — PROPOFOL 150 MG: 10 INJECTION, EMULSION INTRAVENOUS at 10:11

## 2019-08-21 RX ADMIN — GABAPENTIN 600 MG: 300 CAPSULE ORAL at 08:15

## 2019-08-21 RX ADMIN — SODIUM CHLORIDE, SODIUM LACTATE, POTASSIUM CHLORIDE, AND CALCIUM CHLORIDE: 600; 310; 30; 20 INJECTION, SOLUTION INTRAVENOUS at 11:15

## 2019-08-21 RX ADMIN — PHENYLEPHRINE HYDROCHLORIDE 200 MCG: 10 INJECTION INTRAVENOUS at 10:32

## 2019-08-21 RX ADMIN — PHENYLEPHRINE HYDROCHLORIDE 100 MCG: 10 INJECTION INTRAVENOUS at 11:43

## 2019-08-21 RX ADMIN — FENTANYL CITRATE 50 MCG: 50 INJECTION, SOLUTION INTRAMUSCULAR; INTRAVENOUS at 11:23

## 2019-08-21 RX ADMIN — GLYCOPYRROLATE 0.3 MG: 0.2 INJECTION, SOLUTION INTRAMUSCULAR; INTRAVENOUS at 11:42

## 2019-08-21 RX ADMIN — ACETAMINOPHEN 1000 MG: 500 TABLET, FILM COATED ORAL at 17:03

## 2019-08-21 RX ADMIN — PROPOFOL 50 MG: 10 INJECTION, EMULSION INTRAVENOUS at 11:27

## 2019-08-21 NOTE — ANESTHESIA POSTPROCEDURE EVALUATION
Patient: Savannah Bruner    Procedure Summary     Date:  08/21/19 Room / Location:  [facility] OR  / [facility] MAIN OR    Anesthesia Start:  1003 Anesthesia Stop:  1211    Procedure:  anterior TOTAL HIP ARTHROPLASTY (Right Hip) Diagnosis:       Primary osteoarthritis of right hip      (Primary osteoarthritis of right hip [M16.11])    Surgeon:  Daniel Tolentino MD Provider:  Huseyin Wise MD    Anesthesia Type:  general ASA Status:  2          Anesthesia Type: general  Last vitals  BP   158/70 (08/21/19 1330)   Temp   97.8 °F (36.6 °C) (08/21/19 1330)   Pulse   77 (08/21/19 1330)   Resp   14 (08/21/19 1330)     SpO2   99 % (08/21/19 1330)     Post Anesthesia Care and Evaluation    Patient location during evaluation: PACU  Patient participation: complete - patient participated  Level of consciousness: awake  Pain scale: See nurse's notes for pain score.  Pain management: adequate  Airway patency: patent  Anesthetic complications: No anesthetic complications  PONV Status: none  Cardiovascular status: acceptable  Respiratory status: acceptable  Hydration status: acceptable    Comments: Patient seen and examined postoperatively; vital signs stable; SpO2 greater than or equal to 90%; cardiopulmonary status stable; nausea/vomiting adequately controlled; pain adequately controlled; no apparent anesthesia complications; patient discharged from anesthesia care when discharge criteria were met

## 2019-08-21 NOTE — PLAN OF CARE
Problem: Patient Care Overview  Goal: Plan of Care Review  Outcome: Ongoing (interventions implemented as appropriate)   08/21/19 1611   OTHER   Outcome Summary 63 y/o F s/p R ant SHANDA per Dr. Tolentino POD 0. Pt performs bed mobility, transfers and ambulation with CGA. Pt limited this date by nausea and dizziness as session progressed. BP WNL. Pt plans to d/c to her daughters house with 24/7 assist and OP PT. Only 1 WALTER. Will follow 2x daily while IP at [facility].

## 2019-08-21 NOTE — H&P
History & Physical       Patient: Savannah Bruner    Date of Admission: 8/21/2019  5:46 AM    YOB: 1955    Medical Record Number: [id number]    Attending Physician: Daniel Tolentino MD        Chief Complaints: Primary osteoarthritis of the right hip[M16.11]      History of Present Illness: 64 y.o. female presents with pain and discomfort of the right hip with a limp.  Onset of symptoms was gradual and slowly progressive.  Symptoms are associated with pain in the groin on the right side with radiation to the lateral aspect of the femur.  Symptoms are aggravated by cross body activities and going up and down the steps.   Symptoms improve with using a cane and nsaids. Patient is now being admitted to the services of Daniel Tolentino MD for further evaluation and treatment.        Allergies   Allergen Reactions   • Latex Itching     Hands itch not diagnosed         Home Medications:  Medications Prior to Admission   Medication Sig Dispense Refill Last Dose   • B Complex Vitamins (VITAMIN B COMPLEX) capsule capsule Take 1 capsule by mouth 2 (Two) Times a Day.   Not Taking   • escitalopram (LEXAPRO) 10 MG tablet Take 10-15 mg by mouth Daily.  12 Taking   • ferrous sulfate 324 (65 Fe) MG tablet delayed-release EC tablet Take 324 mg by mouth Daily With Breakfast.   Not Taking   • glucosamine sulfate 500 MG capsule capsule Take  by mouth Daily.   Not Taking   • ibuprofen (ADVIL,MOTRIN) 400 MG tablet Take 400 mg by mouth As Needed for Mild Pain .   Not Taking   • LUMIGAN 0.01 % ophthalmic drops INSTILL 1 DROP INTO EACH EYE daily  6 Not Taking   • vitamin E 1000 UNIT capsule Take 400 Units by mouth Daily.   Not Taking   • Ferrous Sulfate (IRON) 28 MG tablet Take  by mouth Daily.   Taking       Current Medications:  Scheduled Meds:  Continuous Infusions:  No current facility-administered medications for this encounter.   PRN Meds:.       Past Medical History:   Diagnosis Date   • Anxiety    • Arthritis    • Chronic  headaches     much improved after hysterectomy   • Dysphagia     at times   • Glaucoma    • Rash     wrists and hands with excessive washing   • Right hip pain         Past Surgical History:   Procedure Laterality Date   •  SECTION     • HYSTERECTOMY     • SKIN LESION EXCISION     • TONSILLECTOMY          Social History     Occupational History   • Not on file   Tobacco Use   • Smoking status: Never Smoker   • Smokeless tobacco: Never Used   Substance and Sexual Activity   • Alcohol use: No     Frequency: Never   • Drug use: No   • Sexual activity: Defer    Social History     Social History Narrative   • Not on file        Family History   Problem Relation Age of Onset   • Cancer Mother    • Diabetes Maternal Grandmother    • Heart disease Maternal Grandmother    • Cancer Maternal Grandmother          Review of Systems:   HEENT: Patient denies any headaches, vision changes, change in hearing, or tinnitus, Patient denies any rhinorrhea,epistaxis, sinus pain, mouth or dental problems, sore throat or hoarseness, or dysphagia  Pulmonary: Patient denies any cough, congestion, SOA, or wheezing  Cardiovascular: Patient denies any chest pain, dyspnea, palpitations, weakness, intolerance of exercise, varicosities, swelling of extremities, known murmur  Gastrointestinal:  Patient denies nausea, vomiting, diarrhea, constipation, loss  of appetite, change in appetite, dysphagia, gas, heartburn, melena, change in bowel habits, use of laxatives or other drugs to alter the function of the gastrointestinal tract.  Genital/Urinary: Patient denies dysuria, change in color of urine, change in frequency of urination, pain with urgency, incontinence, retention, or nocturia.  Musculoskeletal: Patient denies increased warmth; redness; or swelling of joints; limitation of function; deformity; crepitation: pain in a joint or an extremity, the neck, or the back, especially with movement.  Neurological: Patient denies dizziness,  "tremor, ataxia, difficulty in speaking, change in speech, paresthesia, loss of sensation, seizures, syncope, changes in memory.  Endocrine system: Patient denies tremors, palpitations, intolerance of heat or cold, polyuria, polydipsia, polyphagia, diaphoresis, exophthalmos, or goiter.  Psychological: Patient denies thoughts/plans or harming self or other; depression,  insomnia, night terrors, gloria, memory loss, disorientation.  Skin: Patient denies any bruising, rashes, discoloration, pruritus, wounds, ulcers, decubiti, changes in the hair or nails  Hematopoietic: Patient denies history of spontaneous or excessive bleeding, epistaxis, hematuria, melena, fatigue, enlarged or tender lymph nodes, pallor, history of anemia.    Physical Exam: 64 y.o. female  Vitals:    08/05/19 0932   BP: 162/82   BP Location: Left arm   Patient Position: Sitting   Pulse: 74   SpO2: 99%   Weight: 66 kg (145 lb 6.4 oz)   Height: 157.5 cm (62\")       General Appearance:          Alert, cooperative, in no acute distress                                                 Head:    Normocephalic, without obvious abnormality, atraumatic   Eyes:            Lids and lashes normal, conjunctivae and sclerae normal, no   icterus, no pallor, corneas clear, PERRLA   Ears:    Ears appear intact with no abnormalities noted   Throat:   No oral lesions, no thrush, oral mucosa moist   Neck:   No adenopathy, supple, trachea midline, no thyromegaly, no   carotid bruit, no JVD   Back:     No kyphosis present, no scoliosis present, no skin lesions,      erythema or scars, no tenderness to percussion or                   palpation,   range of motion normal   Lungs:     Clear to auscultation,respirations regular, even and                  unlabored    Heart:    Regular rhythm and normal rate, normal S1 and S2, no            murmur, no gallop, no rub, no click   Chest Wall:    No abnormalities observed   Abdomen:     Normal bowel sounds, no masses, no organomegaly, " soft        non-tender, non-distended, no guarding, no rebound                tenderness   Rectal:     Deferred   Extremities:   Tenderness over anterior aspect of the right hip joint. Moves all extremities well, no edema,   no cyanosis, no redness   Pulses:   Pulses palpable and equal bilaterally   Skin:   No bleeding, bruising or rash   Lymph nodes:   No palpable adenopathy   Neurologic:   Cranial nerves 2 - 12 grossly intact, sensation intact, DTR       present and equal bilaterally      Right hip. Neurovascular status is intact. Patient has a distant limp on the affected side. Internal and external rotations are associated with pain and discomfort. Anterior joint line pain and tenderness is significant. Stinchfield sign is positive. Figure of 4 sign is positive. Patient is unable to perform an active straight leg raise exam. Greater trochanter is tender. Crossover adduction test is positive. Cross body adduction is limited and painful for the patient. Patient has very significant limp and joint line tenderness anteriorly, posteriorly and medially. Dorsalis pedis and posterior tibial artery pulses are palpable. Common peroneal nerve function is well preserved.     Diagnostic Tests:  No visits with results within 2 Day(s) from this visit.   Latest known visit with results is:   Lab on 08/13/2019   Component Date Value Ref Range Status   • Color, UA 08/13/2019 Yellow  Yellow, Straw Final   • Appearance, UA 08/13/2019 Clear  Clear Final   • pH, UA 08/13/2019 7.5  5.0 - 8.0 Final   • Specific Gravity, UA 08/13/2019 1.010  1.005 - 1.030 Final   • Glucose, UA 08/13/2019 Negative  Negative Final   • Ketones, UA 08/13/2019 Negative  Negative Final   • Bilirubin, UA 08/13/2019 Negative  Negative Final   • Blood, UA 08/13/2019 Negative  Negative Final   • Protein, UA 08/13/2019 Negative  Negative Final   • Leuk Esterase, UA 08/13/2019 Small (1+)* Negative Final   • Nitrite, UA 08/13/2019 Negative  Negative Final   •  Urobilinogen, UA 08/13/2019 0.2 E.U./dL  0.2 - 1.0 E.U./dL Final   • RBC, UA 08/13/2019 0-2* None Seen /HPF Final   • WBC, UA 08/13/2019 0-2* None Seen /HPF Final   • Bacteria, UA 08/13/2019 None Seen  None Seen /HPF Final   • Squamous Epithelial Cells, UA 08/13/2019 0-2  None Seen, 0-2 /HPF Final   • Hyaline Casts, UA 08/13/2019 None Seen  None Seen /LPF Final   • Methodology 08/13/2019 Automated Microscopy   Final     No results found.      Assessment:  Patient Active Problem List   Diagnosis   • Primary osteoarthritis of right hip   • Polyarthropathy or polyarthritis   • Anxiety   • Chronic angle-closure glaucoma   • Depressive disorder   • Hyperlipidemia   • Hypertension, benign   • Primary osteoarthritis of right knee         Plan:  The patient voiced understanding of the risks, benefits, and alternative forms of treatment that were discussed and the patient consents to proceed with right total hip arthroplasty.     The patient was seen today for preoperative discussion.  The patient has been tried on over-the-counter and prescription NSAID's despite the risks of anti-inflammatory bleeding, peptic ulcers and erosive gastritis with short term benefit only.  Braces have been prescribed for mechanical support.  Patient has been participating in an exercise program specifically targeting joint pain relief with limited benefit. Intraarticular injections have been used periodically with some but not complete relief of pain.  Ambulation aids have also been utilized.      The details of the surgical procedure were explained including the location of probable incisions and a description of the likely hardware/grafts to be used. The patient understands the likely convalescence after surgery as well as the rehabilitation required.  Also, we have thoroughly discussed with the patient the risks, benefits and alternatives to surgery.  Risks include but are not limited to the risk of infection, joint stiffness, limited range  of motion, wound healing problems, scar tissue build up, myocardial infarction, stroke, blood clots (including DVT and/or pulmonary embolus along with the risk of death) neurologic and/or vascular injury, limb length discrepancy, fracture, dislocation, nonunion, malunion, continued pain and need for further surgery including hardware failure requiring revision.   Discharge Plan: tomorrow to home and home health      Date: 8/21/2019    Daniel Tolentino MD      DICTATED UTILIZING DRAGON DICTATION

## 2019-08-21 NOTE — ANESTHESIA PREPROCEDURE EVALUATION
Anesthesia Evaluation     Patient summary reviewed and Nursing notes reviewed   NPO Solid Status: > 6 hours  NPO Liquid Status: > 6 hours           Airway   Mallampati: II  TM distance: >3 FB  Neck ROM: full  No difficulty expected  Dental    (+) poor dentition    Pulmonary - negative pulmonary ROS and normal exam    breath sounds clear to auscultation  Cardiovascular - normal exam    ECG reviewed  Rhythm: regular  Rate: normal    (+) hypertension,       Neuro/Psych  (+) psychiatric history,     GI/Hepatic/Renal/Endo - negative ROS     Musculoskeletal (-) negative ROS    Abdominal  - normal exam   Substance History - negative use     OB/GYN          Other        ROS/Med Hx Other: Some soreness of right shoulder                Anesthesia Plan    ASA 2     general     intravenous induction   Anesthetic plan, all risks, benefits, and alternatives have been provided, discussed and informed consent has been obtained with: patient and spouse/significant other.  Use of blood products discussed with patient  Consented to blood products.

## 2019-08-21 NOTE — THERAPY EVALUATION
Acute Care - Physical Therapy Initial Evaluation  [facility]     Patient Name: Savannah Bruner  : 1955  MRN: [id number]  Today's Date: 2019                Admit Date: 2019    Visit Dx:     ICD-10-CM ICD-9-CM   1. Primary osteoarthritis of right hip M16.11 715.15     Patient Active Problem List   Diagnosis   • Primary osteoarthritis of right hip   • Polyarthropathy or polyarthritis   • Anxiety   • Chronic angle-closure glaucoma   • Depressive disorder   • Hyperlipidemia   • Hypertension, benign   • Primary osteoarthritis of right knee   • Overweight (BMI 25.0-29.9)     Past Medical History:   Diagnosis Date   • Anxiety    • Arthritis    • Chronic headaches     much improved after hysterectomy   • Dysphagia     at times   • Glaucoma    • Rash     wrists and hands with excessive washing   • Right hip pain      Past Surgical History:   Procedure Laterality Date   •  SECTION     • HYSTERECTOMY     • SKIN LESION EXCISION     • TONSILLECTOMY          PT ASSESSMENT (last 12 hours)      Physical Therapy Evaluation     Row Name 19 1535          PT Evaluation Time/Intention    Document Type  evaluation  -SS     Mode of Treatment  physical therapy  -     Comment  will be d/c'ing to [facility]. 1 story, 1 WALTER. needs RW. 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   -SS     Row Name 19 1535          General Information    Patient Observations  cooperative;alert;agree to therapy  -SS     Patient/Family Observations  seated in bed  -SS     General Observations of Patient  IV  -SS     Prior Level of Function  independent:  -SS     Equipment Currently Used at Home  none  -SS     Pertinent History of Current Functional Problem  63 y/o F s/p ant R SHANDA per Dr. Tolentino POD 0  -SS     Existing Precautions/Restrictions  hip, anterior;right  -SS     Row Name 19 1535          Relationship/Environment    Lives With  spouse  -     Row Name 19 1535          Resource/Environmental Concerns     Current Living Arrangements  home/apartment/condo  -SS     Row Name 08/21/19 1535          Living Environment    Living Arrangements  house  -SS     Home Accessibility  stairs to enter home  -     Row Name 08/21/19 1535          Home Main Entrance    Number of Stairs, Main Entrance  one  -SS     Row Name 08/21/19 1535          Cognitive Assessment/Intervention- PT/OT    Orientation Status (Cognition)  oriented x 4  -SS     Row Name 08/21/19 1535          Mobility Assessment/Treatment    Extremity Weight-bearing Status  right lower extremity  -SS     Right Lower Extremity (Weight-bearing Status)  weight-bearing as tolerated (WBAT)  -     Row Name 08/21/19 1535          Bed Mobility Assessment/Treatment    Bed Mobility Assessment/Treatment  supine-sit  -SS     Supine-Sit [place] (Bed Mobility)  contact guard  -     Row Name 08/21/19 1535          Transfer Assessment/Treatment    Comment (Transfers)  performed ambulatory transfers with CGA and RW.   -     Row Name 08/21/19 1535          Gait/Stairs Assessment/Training    [place] Level (Gait)  contact guard  -     Assistive Device (Gait)  walker, front-wheeled  -     Distance in Feet (Gait)  15 x 2  -SS     Comment (Gait/Stairs)  pt becomes nauseous and dizzy as session progresses. Pt ambultes to toilet, performs toilet transfer, performs ann-marie hygiene and hand hygiene.   -     Row Name 08/21/19 1535          General ROM    GENERAL ROM COMMENTS  grossly WFL  -SS     Row Name 08/21/19 1535          MMT (Manual Muscle Testing)    General MMT Comments  grossly WFL  -SS     Row Name 08/21/19 1535          Sensory Assessment/Intervention    Sensory General Assessment  no sensation deficits identified  -     Row Name             Wound 08/21/19 1155 Right hip Incision    Wound - Properties Group Date first assessed: 08/21/19  -SE Time first assessed: 1155  -SE Side: Right  -SE Location: hip  -SE Primary Wound Type: Incision  -SE    Row Name 08/21/19  1535          Plan of Care Review    Plan of Care Reviewed With  patient  -SS     Row Name 08/21/19 1535          Physical Therapy Clinical Impression    Criteria for Skilled Interventions Met (PT Clinical Impression)  yes;treatment indicated  -SS     Pathology/Pathophysiology Noted (Describe Specifically for Each System)  musculoskeletal  -SS     Impairments Found (describe specific impairments)  gait, locomotion, and balance  -SS     Rehab Potential (PT Clinical Summary)  good, to achieve stated therapy goals  -     Row Name 08/21/19 1535          Vital Signs    Post Systolic BP Rehab  144  -SS     Post Treatment Diastolic BP  61  -     Row Name 08/21/19 1535          Physical Therapy Goals    Bed Mobility Goal Selection (PT)  bed mobility, PT goal 1  -SS     Transfer Goal Selection (PT)  transfer, PT goal 1  -SS     Gait Training Goal Selection (PT)  gait training, PT goal 1  -     Row Name 08/21/19 1535          Bed Mobility Goal 1 (PT)    Activity/Assistive Device (Bed Mobility Goal 1, PT)  bed mobility activities, all  -SS     [place] Level/Cues Needed (Bed Mobility Goal 1, PT)  conditional independence  -SS     Time Frame (Bed Mobility Goal 1, PT)  long term goal (LTG);2 weeks  -     Row Name 08/21/19 1535          Transfer Goal 1 (PT)    Activity/Assistive Device (Transfer Goal 1, PT)  transfers, all  -SS     [place] Level/Cues Needed (Transfer Goal 1, PT)  conditional independence  -SS     Time Frame (Transfer Goal 1, PT)  long term goal (LTG);2 weeks  -     Row Name 08/21/19 1535          Gait Training Goal 1 (PT)    Activity/Assistive Device (Gait Training Goal 1, PT)  gait (walking locomotion);walker, rolling  -SS     [place] Level (Gait Training Goal 1, PT)  conditional independence  -SS     Distance (Gait Goal 1, PT)  150  -SS     Time Frame (Gait Training Goal 1, PT)  long term goal (LTG);2 weeks  -       User Key  (r) = Recorded By, (t) = Taken By, (c) = Cosigned By     Initials Name Provider Type     Josefa Vieyra PT Physical Therapist    Apple Conrad RN Registered Nurse        Physical Therapy Education     Title: PT OT SLP Therapies (Done)     Topic: Physical Therapy (Done)     Point: Mobility training (Done)     Learning Progress Summary           Patient Acceptance, E, VU by  at 8/21/2019  4:14 PM                               User Key     Initials Effective Dates Name Provider Type [facility] 06/19/19 -  Josefa Vieyra PT Physical Therapist PT              PT Recommendation and Plan  Anticipated Discharge Disposition (PT): home with OP services([facility])  Planned Therapy Interventions (PT Eval): balance training, bed mobility training, gait training, home exercise program, patient/family education, ROM (range of motion), stair training, strengthening, transfer training  Therapy Frequency (PT Clinical Impression): 2 times/day  Outcome Summary/Treatment Plan (PT)  Anticipated Equipment Needs at Discharge (PT): front wheeled walker  Anticipated Discharge Disposition (PT): home with OP services([facility])  Plan of Care Reviewed With: patient  Outcome Summary: 65 y/o F s/p R ant SHANDA per Dr. Tolentino POD 0. Pt performs bed mobility, transfers and ambulation with CGA. Pt limited this date by nausea and dizziness as session progressed. BP WNL. Pt plans to d/c to her [facility] with 24/7 assist and OP PT. Only 1 WALTER.  Will follow 2x daily while IP at [facility].      Time Calculation:   PT Charges     Row Name 08/21/19 1614             Time Calculation    Start Time  1535  -      Stop Time  1602  -      Time Calculation (min)  27 min  -      PT Received On  08/21/19  -      PT - Next Appointment  08/22/19  -      PT Goal Re-Cert Due Date  09/04/19  -        User Key  (r) = Recorded By, (t) = Taken By, (c) = Cosigned By    Initials Name Provider Type     Josefa Vieyra, PT Physical Therapist        Therapy Charges for Today      Code Description Service Date Service Provider Modifiers Qty    [id number]  PT EVAL MOD COMPLEXITY 4 8/21/2019 Josefa Vieyra, PT  1    [id number] HC GAIT TRAINING EA 15 MIN 8/21/2019 Josefa Vieyra, PT  1                 Josefa Vieyra, PT  8/21/2019

## 2019-08-22 VITALS
SYSTOLIC BLOOD PRESSURE: 128 MMHG | BODY MASS INDEX: 26.37 KG/M2 | RESPIRATION RATE: 18 BRPM | HEIGHT: 62 IN | DIASTOLIC BLOOD PRESSURE: 73 MMHG | OXYGEN SATURATION: 100 % | WEIGHT: 143.3 LBS | HEART RATE: 70 BPM | TEMPERATURE: 97.9 F

## 2019-08-22 PROBLEM — M16.11 PRIMARY OSTEOARTHRITIS OF RIGHT HIP: Status: RESOLVED | Noted: 2019-07-16 | Resolved: 2019-08-22

## 2019-08-22 LAB
ANION GAP SERPL CALCULATED.3IONS-SCNC: 14.7 MMOL/L (ref 5–15)
BUN BLD-MCNC: 11 MG/DL (ref 8–20)
BUN/CREAT SERPL: 13.8 (ref 5.4–26.2)
CALCIUM SPEC-SCNC: 8.3 MG/DL (ref 8.9–10.3)
CHLORIDE SERPL-SCNC: 104 MMOL/L (ref 101–111)
CO2 SERPL-SCNC: 23 MMOL/L (ref 22–32)
CREAT BLD-MCNC: 0.8 MG/DL (ref 0.4–1)
GFR SERPL CREATININE-BSD FRML MDRD: 72 ML/MIN/1.73
GLUCOSE BLD-MCNC: 151 MG/DL (ref 65–99)
HCT VFR BLD AUTO: 27.5 % (ref 34–46.6)
HGB BLD-MCNC: 9.3 G/DL (ref 12–15.9)
POTASSIUM BLD-SCNC: 3.7 MMOL/L (ref 3.6–5.1)
SODIUM BLD-SCNC: 138 MMOL/L (ref 136–144)

## 2019-08-22 PROCEDURE — 80048 BASIC METABOLIC PNL TOTAL CA: CPT | Performed by: PHYSICIAN ASSISTANT

## 2019-08-22 PROCEDURE — 85018 HEMOGLOBIN: CPT | Performed by: PHYSICIAN ASSISTANT

## 2019-08-22 PROCEDURE — 97110 THERAPEUTIC EXERCISES: CPT

## 2019-08-22 PROCEDURE — 85014 HEMATOCRIT: CPT | Performed by: PHYSICIAN ASSISTANT

## 2019-08-22 PROCEDURE — 97116 GAIT TRAINING THERAPY: CPT

## 2019-08-22 PROCEDURE — 99252 IP/OBS CONSLTJ NEW/EST SF 35: CPT | Performed by: HOSPITALIST

## 2019-08-22 PROCEDURE — 97530 THERAPEUTIC ACTIVITIES: CPT

## 2019-08-22 PROCEDURE — 25010000002 CEFAZOLIN PER 500 MG: Performed by: PHYSICIAN ASSISTANT

## 2019-08-22 PROCEDURE — 99024 POSTOP FOLLOW-UP VISIT: CPT | Performed by: ORTHOPAEDIC SURGERY

## 2019-08-22 PROCEDURE — 99024 POSTOP FOLLOW-UP VISIT: CPT | Performed by: PHYSICIAN ASSISTANT

## 2019-08-22 RX ORDER — GABAPENTIN 300 MG/1
300 CAPSULE ORAL NIGHTLY
Qty: 30 CAPSULE | Refills: 0 | Status: SHIPPED | OUTPATIENT
Start: 2019-08-22 | End: 2019-09-21

## 2019-08-22 RX ORDER — HYDROCODONE BITARTRATE AND ACETAMINOPHEN 5; 325 MG/1; MG/1
TABLET ORAL
Qty: 40 TABLET | Refills: 0 | Status: SHIPPED | OUTPATIENT
Start: 2019-08-22 | End: 2019-10-14

## 2019-08-22 RX ORDER — MELOXICAM 7.5 MG/1
7.5 TABLET ORAL DAILY
Qty: 30 TABLET | Refills: 0 | Status: SHIPPED | OUTPATIENT
Start: 2019-08-23 | End: 2019-09-22

## 2019-08-22 RX ADMIN — ACETAMINOPHEN 1000 MG: 500 TABLET, FILM COATED ORAL at 00:05

## 2019-08-22 RX ADMIN — FERROUS SULFATE TAB EC 324 MG (65 MG FE EQUIVALENT) 324 MG: 324 (65 FE) TABLET DELAYED RESPONSE at 07:56

## 2019-08-22 RX ADMIN — POLYETHYLENE GLYCOL 3350 17 G: 17 POWDER, FOR SOLUTION ORAL at 09:08

## 2019-08-22 RX ADMIN — CEFAZOLIN SODIUM 2 G: 10 INJECTION, POWDER, FOR SOLUTION INTRAVENOUS at 01:31

## 2019-08-22 RX ADMIN — OXYCODONE HYDROCHLORIDE 5 MG: 5 TABLET ORAL at 12:01

## 2019-08-22 RX ADMIN — ACETAMINOPHEN 1000 MG: 500 TABLET, FILM COATED ORAL at 07:56

## 2019-08-22 RX ADMIN — DOCUSATE SODIUM 100 MG: 100 CAPSULE, LIQUID FILLED ORAL at 09:08

## 2019-08-22 RX ADMIN — OXYCODONE HYDROCHLORIDE 10 MG: 5 TABLET ORAL at 07:56

## 2019-08-22 RX ADMIN — OXYCODONE HYDROCHLORIDE 5 MG: 5 TABLET ORAL at 12:14

## 2019-08-22 RX ADMIN — SODIUM CHLORIDE 100 ML/HR: 900 INJECTION, SOLUTION INTRAVENOUS at 01:31

## 2019-08-22 RX ADMIN — MELOXICAM 15 MG: 15 TABLET ORAL at 09:08

## 2019-08-22 NOTE — PLAN OF CARE
Problem: Patient Care Overview  Goal: Plan of Care Review  Outcome: Ongoing (interventions implemented as appropriate)    Goal: Individualization and Mutuality  Outcome: Ongoing (interventions implemented as appropriate)    Goal: Discharge Needs Assessment  Outcome: Ongoing (interventions implemented as appropriate)      Problem: Hip Arthroplasty (Total, Partial) (Adult)  Goal: Signs and Symptoms of Listed Potential Problems Will be Absent, Minimized or Managed (Hip Arthroplasty)  Outcome: Ongoing (interventions implemented as appropriate)    Goal: Anesthesia/Sedation Recovery  Outcome: Ongoing (interventions implemented as appropriate)      Problem: Anxiety (Adult)  Goal: Identify Related Risk Factors and Signs and Symptoms  Outcome: Ongoing (interventions implemented as appropriate)    Goal: Reduction/Resolution  Outcome: Ongoing (interventions implemented as appropriate)

## 2019-08-22 NOTE — DISCHARGE SUMMARY
DISCHARGE SUMMARY      Date of Discharge:  8/22/2019    Discharge Diagnosis: Primary osteoarthritis of right hip    Presenting Problem/History of Present Illness  Active Hospital Problems   No active problems to display.      Resolved Hospital Problems    Diagnosis Date Resolved POA   • **Primary osteoarthritis of right hip [M16.11] 08/22/2019 Yes          Hospital Course  Savannah Bruner is a 64 y.o. female who has a long-standing history of right hip pain.  Preoperative imaging did reveal changes of DJD in the area of the right hip.  After exhausting conservative measures and reviewing the risks and benefits of surgery, she elected to proceed with a right anterior total hip replacement.  She underwent that procedure on 8/21/2019 and was delivered to the recovery room in stable condition.  Upon meeting their transfer criteria, she was transferred to surgical inpatient floor in stable condition.  She received perioperative antibiotics and DVT prophylaxis.  She was evaluated by the physical therapy team and did receive their services throughout her stay.  Her hospital course was unremarkable.  Upon discharge, she was ambulating with the assistance of a walker, tolerating oral diet, and her pain was controlled with oral medication.  Prior to discharge, all her questions and concerns were addressed.    Procedures Performed    Procedure(s):  anterior TOTAL HIP ARTHROPLASTY  -------------------       Consults:   Consults     Date and Time Order Name Status Description    8/21/2019 1342 Inpatient Consult to Hospitalist            Pertinent Test Results: labs: Reviewed and radiology: X-Ray: Reviewed    Condition on Discharge: To home with outpatient physical therapy in stable condition    Vital Signs  Temp:  [97.6 °F (36.4 °C)-98.4 °F (36.9 °C)] 97.9 °F (36.6 °C)  Heart Rate:  [70-79] 70  Resp:  [14-18] 18  BP: (118-132)/(57-73) 128/73    Physical Exam:   General Appearance: alert, pleasant, appears stated age,  cooperative, overweight and Sitting up in chair  Extremities: Right lower extremity is grossly well aligned and refused, sensation intact, dressing clean, dry, and intact, plantar and dorsiflexion intact  Pulses: Pulses palpable  Skin: color normal and no leasions noted    Discharge Medications     Discharge Medications      New Medications      Instructions Start Date   gabapentin 300 MG capsule  Commonly known as:  NEURONTIN   300 mg, Oral, Nightly      HYDROcodone-acetaminophen 5-325 MG per tablet  Commonly known as:  NORCO   1-2 po q4 hours prn pain      meloxicam 7.5 MG tablet  Commonly known as:  MOBIC   7.5 mg, Oral, Daily   Start Date:  8/23/2019     rivaroxaban 10 MG tablet  Commonly known as:  XARELTO   10 mg, Oral, Daily With Dinner         Continue These Medications      Instructions Start Date   escitalopram 10 MG tablet  Commonly known as:  LEXAPRO   10-15 mg, Oral, Daily      ferrous sulfate 324 (65 Fe) MG tablet delayed-release EC tablet   324 mg, Oral, Daily With Breakfast      Iron 28 MG tablet   Oral, Daily      glucosamine sulfate 500 MG capsule capsule   Oral, Daily      LUMIGAN 0.01 % ophthalmic drops  Generic drug:  bimatoprost   INSTILL 1 DROP INTO EACH EYE daily      vitamin b complex capsule capsule   1 capsule, Oral, 2 Times Daily         Stop These Medications    ibuprofen 400 MG tablet  Commonly known as:  ADVIL,MOTRIN     vitamin E 1000 UNIT capsule            Activity at Discharge:     Follow-up Appointments  Future Appointments   Date Time Provider Department Center   9/5/2019  9:00 AM Maddy Mike PA [facility] None     Additional Instructions for the Follow-ups that You Need to Schedule     Ambulatory Referral to Physical Therapy Evaluate and treat   As directed      Specialty needed:  Evaluate and treat               Test Results Pending at Discharge       JUSTO Arriaga  08/22/19  4:21 PM    I certify that this patient is under my care and that I had a face to face  encounter that meets the physician face to face encounter requirements.    The encounter occurred on: 8/22/2019 4:21 PM    Based on the findings of the above encounter, I certify that this patient is confined to the home and needs intermittent skilled nursing care, physical therapy and/or speech therapy.  The patient is under my care, and I have initiated the establishment of the plan of care.    This patient will be followed by a physician who will, periodically, review the plan of care. The findings from this face to face encounter have been communicated with the patient's community based physician who will be assuming this patient's home health plan of care.    I also have provided the agency additional information to support the patient's homebound status and need for skilled care. (Examples of this information could include physician progress notes, discharge summaries, history and physical forms, operative reports, referral order, etc.)    Daniel Tolentino MD

## 2019-08-22 NOTE — PROGRESS NOTES
Discharge Planning Assessment   [facility]     Patient Name: Savannah Bruner  MRN: [id number]  Today's Date: 8/22/2019    Admit Date: 8/21/2019    Discharge Needs Assessment     Row Name 08/22/19 1151       Living Environment    Lives With  spouse    Current Living Arrangements  home/apartment/condo    Primary Care Provided by  self    Provides Primary Care For  no one    Able to Return to Prior Arrangements  yes       Resource/Environmental Concerns    Resource/Environmental Concerns  none    Transportation Concerns  car, none       Transition Planning    Patient/Family Anticipates Transition to  home with help/services    Patient/Family Anticipated Services at Transition      Transportation Anticipated  family or friend will provide       Discharge Needs Assessment    Readmission Within the Last 30 Days  no previous admission in last 30 days    Concerns to be Addressed  discharge planning    Equipment Currently Used at Home  none    Anticipated Changes Related to Illness  none    Equipment Needed After Discharge  walker, rolling    Outpatient/Agency/Support Group Needs  outpatient therapy    Offered/Gave Vendor List  yes        Discharge Plan     Row Name 08/22/19 1152       Plan    Plan  home with outpatient PT at [facility] office. rolling walker from [employer]    Patient/Family in Agreement with Plan  yes    Plan Comments  information sent to [facility] via [employer], called [facility] office, first appointment on August 27th at 10am, patient notified        Destination      No service coordination in this encounter.      Durable Medical Equipment      Service Provider Request Status Selected Services Address Phone Number Fax Number    [facility] Pending - Request Sent N/A [address] [id number] [phone]      Dialysis/Infusion      No service coordination in this encounter.      Home Medical Care      No service coordination in this encounter.      Therapy       Service Provider Request Status Selected Services Address Phone Number Fax Number    [facility] Pending - Request Sent N/A [address], [address] [facility], [place] [address] [phone]      [facility] Resources      No service coordination in this encounter.                  Adriana Hall RN

## 2019-08-22 NOTE — DISCHARGE PLACEMENT REQUEST
"Savannah Nielsen (64 y.o. Female)     Date of Birth Social Security Number Address Home Phone MRN    1955  [id number]  [place] [address] [phone] [id number]    [place] Marital Status          Denominational        Admission Date Admission Type Admitting Provider Attending Provider Department, Room/Bed    8/21/19 Elective Daniel Tolentino MD Mehta, Sanjiv, MD [facility] SURGICAL INPATIENT, 4119/1    Discharge Date Discharge Disposition Discharge Destination                       Attending Provider:  Daniel Tolentino MD    Allergies:  Latex    Isolation:  None   Infection:  None   Code Status:  CPR    Ht:  157.5 cm (62\")   Wt:  65 kg (143 lb 4.8 oz)    Admission Cmt:  None   Principal Problem:  Primary osteoarthritis of right hip [M16.11]                 Active Insurance as of 8/21/2019     Primary Coverage     Payor Plan Insurance Group Employer/Plan Group    HU[employer] HUMANA [id number]     Payor Plan Address Payor Plan Phone Number Payor Plan Fax Number Effective Dates    [address] [address] [phone]  1/1/2018 - None Entered    [facility] [address]       Subscriber Name Subscriber Birth Date Member ID       SAVANNAH NIELSEN 1955 [id number]                 Emergency Contacts      (Rel.) Home Phone Work Phone Mobile Phone    NIELSENISAIAS WALTERS (Spouse) [phone] -- [phone]    RODRICK CRISTOBAL (Daughter) -- -- [phone]              "

## 2019-08-22 NOTE — DISCHARGE PLACEMENT REQUEST
"Savannah Bruner (64 y.o. Female)     Date of Birth Social Security Number Address Home Phone MRN    1955  [id number]  [place] [address] [phone] [id number]    Rastafarian Marital Status          Restoration        Admission Date Admission Type Admitting Provider Attending Provider Department, Room/Bed    8/21/19 Elective Daniel Tolentino MD Mehta, Sanjiv, MD [facility], 4119/1    Discharge Date Discharge Disposition Discharge Destination                       Attending Provider:  Daniel Tolentino MD    Allergies:  Latex    Isolation:  None   Infection:  None   Code Status:  CPR    Ht:  157.5 cm (62\")   Wt:  65 kg (143 lb 4.8 oz)    Admission Cmt:  None   Principal Problem:  Primary osteoarthritis of right hip [M16.11]                 Active Insurance as of 8/21/2019     Primary Coverage     Payor Plan Insurance Group Employer/Plan Group    HUMANA HUMANA [id number]     Payor Plan Address Payor Plan Phone Number Payor Plan Fax Number Effective Dates    [address] [address] [phone]  1/1/2018 - None Entered    Formerly [facility] [id number]       Subscriber Name Subscriber Birth Date Member ID       SAVANNAH BRUNER 1955 [id number]                 Emergency Contacts      (Rel.) Home Phone Work Phone Mobile Phone    ISAIAS BRUNER (Spouse) [phone] -- [phone]    RODRICK CRISTOBAL (Daughter) -- -- [phone]            Insurance Information                HUMANA/HUMANA Phone: [phone]    Subscriber: Savannah Bruner Subscriber#: [id number]    Group#: [id number] Precert#:              History & Physical      Daniel Tolentino MD at 8/21/2019  6:01 AM          History & Physical       Patient: Savannah Bruner    Date of Admission: 8/21/2019  5:46 AM    YOB: 1955    Medical Record Number: [id number]    Attending Physician: Daniel Tolentino MD        Chief Complaints: Primary osteoarthritis of the right hip[M16.11]      History of Present Illness: " 64 y.o. female presents with pain and discomfort of the right hip with a limp.  Onset of symptoms was gradual and slowly progressive.  Symptoms are associated with pain in the groin on the right side with radiation to the lateral aspect of the femur.  Symptoms are aggravated by cross body activities and going up and down the steps.   Symptoms improve with using a cane and nsaids. Patient is now being admitted to the services of Daniel Tolentino MD for further evaluation and treatment.        Allergies   Allergen Reactions   • Latex Itching     Hands itch not diagnosed         Home Medications:  Medications Prior to Admission   Medication Sig Dispense Refill Last Dose   • B Complex Vitamins (VITAMIN B COMPLEX) capsule capsule Take 1 capsule by mouth 2 (Two) Times a Day.   Not Taking   • escitalopram (LEXAPRO) 10 MG tablet Take 10-15 mg by mouth Daily.  12 Taking   • ferrous sulfate 324 (65 Fe) MG tablet delayed-release EC tablet Take 324 mg by mouth Daily With Breakfast.   Not Taking   • glucosamine sulfate 500 MG capsule capsule Take  by mouth Daily.   Not Taking   • ibuprofen (ADVIL,MOTRIN) 400 MG tablet Take 400 mg by mouth As Needed for Mild Pain .   Not Taking   • LUMIGAN 0.01 % ophthalmic drops INSTILL 1 DROP INTO EACH EYE daily  6 Not Taking   • vitamin E 1000 UNIT capsule Take 400 Units by mouth Daily.   Not Taking   • Ferrous Sulfate (IRON) 28 MG tablet Take  by mouth Daily.   Taking       Current Medications:  Scheduled Meds:  Continuous Infusions:  No current facility-administered medications for this encounter.   PRN Meds:.       Past Medical History:   Diagnosis Date   • Anxiety    • Arthritis    • Chronic headaches     much improved after hysterectomy   • Dysphagia     at times   • Glaucoma    • Rash     wrists and hands with excessive washing   • Right hip pain         Past Surgical History:   Procedure Laterality Date   •  SECTION     • HYSTERECTOMY     • SKIN LESION EXCISION     • TONSILLECTOMY           Social History     Occupational History   • Not on file   Tobacco Use   • Smoking status: Never Smoker   • Smokeless tobacco: Never Used   Substance and Sexual Activity   • Alcohol use: No     Frequency: Never   • Drug use: No   • Sexual activity: Defer    Social History     Social History Narrative   • Not on file        Family History   Problem Relation Age of Onset   • Cancer Mother    • Diabetes Maternal Grandmother    • Heart disease Maternal Grandmother    • Cancer Maternal Grandmother          Review of Systems:   HEENT: Patient denies any headaches, vision changes, change in hearing, or tinnitus, Patient denies any rhinorrhea,epistaxis, sinus pain, mouth or dental problems, sore throat or hoarseness, or dysphagia  Pulmonary: Patient denies any cough, congestion, SOA, or wheezing  Cardiovascular: Patient denies any chest pain, dyspnea, palpitations, weakness, intolerance of exercise, varicosities, swelling of extremities, known murmur  Gastrointestinal:  Patient denies nausea, vomiting, diarrhea, constipation, loss  of appetite, change in appetite, dysphagia, gas, heartburn, melena, change in bowel habits, use of laxatives or other drugs to alter the function of the gastrointestinal tract.  Genital/Urinary: Patient denies dysuria, change in color of urine, change in frequency of urination, pain with urgency, incontinence, retention, or nocturia.  Musculoskeletal: Patient denies increased warmth; redness; or swelling of joints; limitation of function; deformity; crepitation: pain in a joint or an extremity, the neck, or the back, especially with movement.  Neurological: Patient denies dizziness, tremor, ataxia, difficulty in speaking, change in speech, paresthesia, loss of sensation, seizures, syncope, changes in memory.  Endocrine system: Patient denies tremors, palpitations, intolerance of heat or cold, polyuria, polydipsia, polyphagia, diaphoresis, exophthalmos, or goiter.  Psychological: Patient  "denies thoughts/plans or harming self or other; depression,  insomnia, night terrors, gloria, memory loss, disorientation.  Skin: Patient denies any bruising, rashes, discoloration, pruritus, wounds, ulcers, decubiti, changes in the hair or nails  Hematopoietic: Patient denies history of spontaneous or excessive bleeding, epistaxis, hematuria, melena, fatigue, enlarged or tender lymph nodes, pallor, history of anemia.    Physical Exam: 64 y.o. female  Vitals:    08/05/19 0932   BP: 162/82   BP Location: Left arm   Patient Position: Sitting   Pulse: 74   SpO2: 99%   Weight: 66 kg (145 lb 6.4 oz)   Height: 157.5 cm (62\")       General Appearance:          Alert, cooperative, in no acute distress                                                 Head:    Normocephalic, without obvious abnormality, atraumatic   Eyes:            Lids and lashes normal, conjunctivae and sclerae normal, no   icterus, no pallor, corneas clear, PERRLA   Ears:    Ears appear intact with no abnormalities noted   Throat:   No oral lesions, no thrush, oral mucosa moist   Neck:   No adenopathy, supple, trachea midline, no thyromegaly, no   carotid bruit, no JVD   Back:     No kyphosis present, no scoliosis present, no skin lesions,      erythema or scars, no tenderness to percussion or                   palpation,   range of motion normal   Lungs:     Clear to auscultation,respirations regular, even and                  unlabored    Heart:    Regular rhythm and normal rate, normal S1 and S2, no            murmur, no gallop, no rub, no click   Chest Wall:    No abnormalities observed   Abdomen:     Normal bowel sounds, no masses, no organomegaly, soft        non-tender, non-distended, no guarding, no rebound                tenderness   Rectal:     Deferred   Extremities:   Tenderness over anterior aspect of the right hip joint. Moves all extremities well, no edema,   no cyanosis, no redness   Pulses:   Pulses palpable and equal bilaterally   Skin:   " No bleeding, bruising or rash   Lymph nodes:   No palpable adenopathy   Neurologic:   Cranial nerves 2 - 12 grossly intact, sensation intact, DTR       present and equal bilaterally      Right hip. Neurovascular status is intact. Patient has a distant limp on the affected side. Internal and external rotations are associated with pain and discomfort. Anterior joint line pain and tenderness is significant. Stinchfield sign is positive. Figure of 4 sign is positive. Patient is unable to perform an active straight leg raise exam. Greater trochanter is tender. Crossover adduction test is positive. Cross body adduction is limited and painful for the patient. Patient has very significant limp and joint line tenderness anteriorly, posteriorly and medially. Dorsalis pedis and posterior tibial artery pulses are palpable. Common peroneal nerve function is well preserved.     Diagnostic Tests:  No visits with results within 2 Day(s) from this visit.   Latest known visit with results is:   Lab on 08/13/2019   Component Date Value Ref Range Status   • Color, UA 08/13/2019 Yellow  Yellow, Straw Final   • Appearance, UA 08/13/2019 Clear  Clear Final   • pH, UA 08/13/2019 7.5  5.0 - 8.0 Final   • Specific Gravity, UA 08/13/2019 1.010  1.005 - 1.030 Final   • Glucose, UA 08/13/2019 Negative  Negative Final   • Ketones, UA 08/13/2019 Negative  Negative Final   • Bilirubin, UA 08/13/2019 Negative  Negative Final   • Blood, UA 08/13/2019 Negative  Negative Final   • Protein, UA 08/13/2019 Negative  Negative Final   • Leuk Esterase, UA 08/13/2019 Small (1+)* Negative Final   • Nitrite, UA 08/13/2019 Negative  Negative Final   • Urobilinogen, UA 08/13/2019 0.2 E.U./dL  0.2 - 1.0 E.U./dL Final   • RBC, UA 08/13/2019 0-2* None Seen /HPF Final   • WBC, UA 08/13/2019 0-2* None Seen /HPF Final   • Bacteria, UA 08/13/2019 None Seen  None Seen /HPF Final   • Squamous Epithelial Cells, UA 08/13/2019 0-2  None Seen, 0-2 /HPF Final   • Hyaline  RHONA Greco 08/13/2019 None Seen  None Seen /LPF Final   • Methodology 08/13/2019 Automated Microscopy   Final     No results found.      Assessment:  Patient Active Problem List   Diagnosis   • Primary osteoarthritis of right hip   • Polyarthropathy or polyarthritis   • Anxiety   • Chronic angle-closure glaucoma   • Depressive disorder   • Hyperlipidemia   • Hypertension, benign   • Primary osteoarthritis of right knee         Plan:  The patient voiced understanding of the risks, benefits, and alternative forms of treatment that were discussed and the patient consents to proceed with right total hip arthroplasty.     The patient was seen today for preoperative discussion.  The patient has been tried on over-the-counter and prescription NSAID's despite the risks of anti-inflammatory bleeding, peptic ulcers and erosive gastritis with short term benefit only.  Braces have been prescribed for mechanical support.  Patient has been participating in an exercise program specifically targeting joint pain relief with limited benefit. Intraarticular injections have been used periodically with some but not complete relief of pain.  Ambulation aids have also been utilized.      The details of the surgical procedure were explained including the location of probable incisions and a description of the likely hardware/grafts to be used. The patient understands the likely convalescence after surgery as well as the rehabilitation required.  Also, we have thoroughly discussed with the patient the risks, benefits and alternatives to surgery.  Risks include but are not limited to the risk of infection, joint stiffness, limited range of motion, wound healing problems, scar tissue build up, myocardial infarction, stroke, blood clots (including DVT and/or pulmonary embolus along with the risk of death) neurologic and/or vascular injury, limb length discrepancy, fracture, dislocation, nonunion, malunion, continued pain and need for further  surgery including hardware failure requiring revision.   Discharge Plan: tomorrow to home and home health      Date: 8/21/2019    Daniel Tolentino MD      DICTATED UTILIZING DRAGON DICTATION    Electronically signed by Daniel Tolentino MD at 8/21/2019  6:05 AM       Referral Orders (last 24 hours) (24h ago, onward)    Start     Ordered    08/22/19 0000  Ambulatory Referral to Physical Therapy Evaluate and treat      08/22/19 1034          Consult Orders (last 24 hours) (24h ago, onward)    Start     Ordered    08/21/19 1357  Inpatient Spiritual Care Consult  Once     Provider:  (Not yet assigned)    08/21/19 1356    08/21/19 1343  Inpatient Consult to Hospitalist  Once     Specialty:  Hospitalist  Provider:  Xiomy Noe MD    08/21/19 1342    08/21/19 1343  Inpatient Consult to Case Management   Once     Provider:  (Not yet assigned)    08/21/19 1342          Activity Orders (active) (From admission, onward)    Start     Ordered    08/22/19 0800  Up in Chair 3x / Day - Beginning POD 1  3 Times Daily      08/21/19 1342    08/22/19 0800  Ambulate in Noe 4x / Day Beginning POD 1  4 Times Daily      08/21/19 1342    08/21/19 1343  Weight Bearing - As Tolerated  Continuous     Comments:  Anterior Hip Precautions    08/21/19 1342    08/21/19 1343  Ambulate in Noe x1 Day of Surgery  Once      08/21/19 1342    Unscheduled  Up in Chair x 1 day of surgery, then up in chair x 3 beginning POD #1.  As Needed      08/21/19 1342             Operative/Procedure Notes (last 48 hours) (Notes from 8/20/2019 10:42 AM through 8/22/2019 10:42 AM)      Daniel Tolentino MD at 8/21/2019 10:41 AM        TOTAL HIP ARTHROPLASTY     PATIENT NAME:  Savannah Bruner     YOB: 1955     ATTENDING PHYSICIAN: Daniel Tolentino MD     DATE OF PROCEDURE: 8/21/2019     PREOPERATIVE DIAGNOSIS: Primary osteoarthritis of right hip [M16.11]     Post-Op Diagnosis Codes:     * Primary osteoarthritis of right hip [M16.11]    PRINCIPAL  DIAGNOSIS: Primary osteoarthritis of right hip.    PROCEDURE: Right total hip arthroplasty, direct anterior.    SURGEON:  Daniel Tolentino M.D.    ASSISTANT: first Mia assist.    ANESTHESIOLOGIST: Dr. Axel Wise MD    ANESTHESIA: General with an LMA    POSITION: Supine on a Coleman table.    DRAINS: None    COMPLICATIONS: None    ESTIMATED BLOOD LOSS: 300 mL    SPECIMENS: * No orders in the log *    IMPLANT USED: Ryan press-fit total hip replacement system, #2 HA-coated standard neck length, standard offset, Avenir femoral stem with a 32mm diameter ceramic head, -3.5 mm neck length, 46mm Ryan G-7 Osseoti TM coated multi-hole cup with a single dome screw in the posterosuperior quadrant with a highly cross-linked, Vitamin E impregnated, polyethylene liner neutral.    INDICATION: Severe pain and discomfort in the hip.  All risks and benefits, potential complications, possibility of death, infection, myocardial infarction, DVT, pulmonary embolism, wound and soft tissue breakdown, dislocation of the prosthesis, limb length discrepancy, etc. were all discussed with the patient and an informed consent was signed.     DETAILS OF PROCEDURE: Surgical timeout was called. Operative extremity was correctly identified in the operating room suite. Patient was given antibiotics per the SCIP protocol.  Patient was placed under appropriate anesthetic. C-arm was used through the entire procedure to accurately evaluate the limb lengths and the stability of the components as well as appropriate positioning of the components in the proximal femur and the acetabulum.  Patient was placed on the Coleman table, prepped and draped in a standard fashion.  A direct anterior approach was carried out.      The fascia over the TFL was incised. The TFL was retracted laterally. A Cobra retractor was placed on the superior aspect of the femoral neck. A second Cobra was placed on the inferior aspect of the femoral neck. The leg was manipulated and  the anterior capsule was carefully identified. The rectus femoris was retracted medially. Distally, the vastus lateralis was mobilized with a Baron elevator and L-shaped capsulotomy was carried out and the flaps of the capsule were developed. FiberWire sutures were placed in the flaps of the capsule to allow for mobilization and subsequent closure of the soft tissue envelope. The Cobra retractors were placed subperiosteally, one inferior to the femoral neck and one superior to the femoral neck. The dissection was carried out up to the saddle of the greater trochanter laterally. The femoral osteotomy was carried out along the line of the broach. A napkin ring segment of femoral neck was removed to allow easy removal of the femoral head. A motorized corkscrew was used and the femoral head was removed from the acetabulum.    Retractors were placed around the acetabulum at the 4 o’clock to 7 o’clock and the 11 o’clock positions. Acetabular labrum was resected. The ligamentum teres was resected. Reaming was commenced under direct C-arm control.  Appropriate amounts of flexion, abduction and anteversion were built into the reaming process. Smaller sized reamer was used and the smaller sized reamer was used to medialize the acetabulum up to the teardrop, which was visualized on the C-arm monitors. The reaming size went up progressively up until a good bed of bleeding subchondral bone was exposed. The acetabulum was then lavaged with Bacitracin irrigating solution. Diluted Betadine was used in antibiotic solution and the 46 mm, G7 multi-hole cup was then impacted and locked into position on the native acetabulum. Appropriate amounts of flexion, abduction and anteversion were built into the positioning of the cup. A screw was placed in the posterosuperior quadrant to augment the stability of the cup. The neutral polyethylene liner, Vitamin E impregnated, highly cross-linked was impacted and locked into position as  well.    Attention was then directed towards the proximal femur.  The proximal femur was delivered into the wound with a combination of adduction, extension and external rotation.  The pigtail device was placed subperiosteally under the proximal femur and attached to the [place] table to stabilize the femur for instrumentation.  The piriformis fossa was cleared of all soft tissue.  The cancellus bone on the internal aspect of the lateral femur was removed with a curette to minimize the malpositioning of the broaches into varus.  The rat tailed device was then used to enter into the femoral canal.  We then commenced broaching with the smallest broach.  The broach was leaned up against the lateral cortex to minimize varus-valgus malposition.  We broached up to the point that the proximal metaphysis was well filled and there was good torsional stability to the broach.  A #2 broach was found to be the best fit.  A trial head and neck was placed and the femur was reduced into the acetabulum.  The limb lengths were checked and found to be anatomically accurate.  Intraoperative C-arm images were obtained to determine the limb lengths and the positioning of the components.  These were found to be anatomically accurate.  The femoral canal was then lavaged with bacitracin irrigating solution and dried.  The #2, HA-coated, standard neck length, standard offset Avenir femoral component was impacted into position and a good fit was obtained.  There was good torsional stability.  Some bone graft was packed around the femoral component to promote ingrowth.  The trunnion of the femoral component was dried and the 32 mm diameter, -3.5 mm neck length, Biolox ceramic head ball was impacted and locked onto the femoral component.  Final reduction was carried out.  The stability and limb length were checked one more time.  There was no tendency towards dislocation of the components in any position of the lower extremity.  Limb lengths were  checked on C-arm images found to be anatomical.  The capsule and the sub-periosteal structures were infiltrated with a analgesic for postoperative analgesia.  The anterior capsule was repaired with interrupted sutures.  The fascia over the TFL was repaired with interrupted suture.  Subcuticular sutures applied.  Occlusive dressing was applied.  Sponge gauze and needle count was correct.  No complications were encountered.  Patient was reversed from the anesthetic and taken from the operating room to the recovery room in stable condition.  No complications encountered.  I discussed the satisfactory performance is procedure with the patient's family and answered all questions for them.     Daniel Tolentino MD  2019  12:32 PM      Electronically signed by Daniel Tolentino MD at 2019  2:43 PM          Physical Therapy Notes (last 24 hours) (Notes from 2019 10:42 AM through 2019 10:42 AM)      Josefa Vieyra PT at 2019  4:13 PM  Version 1 of 1         Problem: Patient Care Overview  Goal: Plan of Care Review  Outcome: Ongoing (interventions implemented as appropriate)   19 1611   OTHER   Outcome Summary 63 y/o F s/p R ant SHANDA per Dr. Tolentino POD 0. Pt performs bed mobility, transfers and ambulation with CGA. Pt limited this date by nausea and dizziness as session progressed. BP WNL. Pt plans to d/c to her daughters house with / assist and OP PT. Only 1 WALTER. Will follow 2x daily while IP at [facility].            Electronically signed by Josefa Vieyra PT at 2019  4:13 PM     Josefa Vieyra PT at 2019  4:14 PM  Version 1 of 1         Acute Care - Physical Therapy Initial Evaluation  [facility]     Patient Name: Savannah Bruner  : 1955  MRN: [id number]  Today's Date: 2019                Admit Date: 2019    Visit Dx:     ICD-10-CM ICD-9-CM   1. Primary osteoarthritis of right hip M16.11 715.15     Patient Active Problem List   Diagnosis   • Primary osteoarthritis  of right hip   • Polyarthropathy or polyarthritis   • Anxiety   • Chronic angle-closure glaucoma   • Depressive disorder   • Hyperlipidemia   • Hypertension, benign   • Primary osteoarthritis of right knee   • Overweight (BMI 25.0-29.9)     Past Medical History:   Diagnosis Date   • Anxiety    • Arthritis    • Chronic headaches     much improved after hysterectomy   • Dysphagia     at times   • Glaucoma    • Rash     wrists and hands with excessive washing   • Right hip pain      Past Surgical History:   Procedure Laterality Date   •  SECTION     • HYSTERECTOMY     • SKIN LESION EXCISION     • TONSILLECTOMY          PT ASSESSMENT (last 12 hours)      Physical Therapy Evaluation     Row Name 19 1535          PT Evaluation Time/Intention    Document Type  evaluation  -SS     Mode of Treatment  physical therapy  -SS     Comment  will be d/c'ing to daughters house. 1 story, 1 WALTER. needs RW. 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.   -SS     Row Name 19 1539          General Information    Patient Observations  cooperative;alert;agree to therapy  -SS     Patient/Family Observations  seated in bed  -SS     General Observations of Patient  IV  -SS     Prior Level of Function  independent:  -SS     Equipment Currently Used at Home  none  -SS     Pertinent History of Current Functional Problem  63 y/o F s/p ant R SHANDA per Dr. Tolentino POD 0  -SS     Existing Precautions/Restrictions  hip, anterior;right  -SS     Row Name 19 1532          Relationship/Environment    Lives With  spouse  -     Row Name 19 1532          Resource/Environmental Concerns    Current Living Arrangements  home/apartment/condo  -     Row Name 19 1538          Living Environment    Living Arrangements  house  -SS     Home Accessibility  stairs to enter home  -SS     Row Name 19 1530          Home Main Entrance    Number of Stairs, Main Entrance  one  -SS     Row Name 19 1530          Cognitive  Assessment/Intervention- PT/OT    Orientation Status (Cognition)  oriented x 4  -SS     Row Name 08/21/19 1535          Mobility Assessment/Treatment    Extremity Weight-bearing Status  right lower extremity  -SS     Right Lower Extremity (Weight-bearing Status)  weight-bearing as tolerated (WBAT)  -     Row Name 08/21/19 1535          Bed Mobility Assessment/Treatment    Bed Mobility Assessment/Treatment  supine-sit  -SS     Supine-Sit Cidra (Bed Mobility)  contact guard  -SS     Row Name 08/21/19 1535          Transfer Assessment/Treatment    Comment (Transfers)  performed ambulatory transfers with CGA and RW.   -     Row Name 08/21/19 1535          Gait/Stairs Assessment/Training    [place] Level (Gait)  contact guard  -SS     Assistive Device (Gait)  walker, front-wheeled  -SS     Distance in Feet (Gait)  15 x 2  -SS     Comment (Gait/Stairs)  pt becomes nauseous and dizzy as session progresses. Pt ambultes to toilet, performs toilet transfer, performs ann-marie hygiene and hand hygiene.   -     Row Name 08/21/19 1535          General ROM    GENERAL ROM COMMENTS  grossly WFL  -SS     Row Name 08/21/19 1535          MMT (Manual Muscle Testing)    General MMT Comments  grossly WFL  -SS     Row Name 08/21/19 1535          Sensory Assessment/Intervention    Sensory General Assessment  no sensation deficits identified  -     Row Name             Wound 08/21/19 1155 Right hip Incision    Wound - Properties Group Date first assessed: 08/21/19  -SE Time first assessed: 1155  -SE Side: Right  -SE Location: hip  -SE Primary Wound Type: Incision  -SE    Row Name 08/21/19 1535          Plan of Care Review    Plan of Care Reviewed With  patient  -     Row Name 08/21/19 1531          Physical Therapy Clinical Impression    Criteria for Skilled Interventions Met (PT Clinical Impression)  yes;treatment indicated  -SS     Pathology/Pathophysiology Noted (Describe Specifically for Each System)  musculoskeletal   -     Impairments Found (describe specific impairments)  gait, locomotion, and balance  -     Rehab Potential (PT Clinical Summary)  good, to achieve stated therapy goals  -     Row Name 08/21/19 1533          Vital Signs    Post Systolic BP Rehab  144  -SS     Post Treatment Diastolic BP  61  -     Row Name 08/21/19 1535          Physical Therapy Goals    Bed Mobility Goal Selection (PT)  bed mobility, PT goal 1  -SS     Transfer Goal Selection (PT)  transfer, PT goal 1  -     Gait Training Goal Selection (PT)  gait training, PT goal 1  -     Row Name 08/21/19 1530          Bed Mobility Goal 1 (PT)    Activity/Assistive Device (Bed Mobility Goal 1, PT)  bed mobility activities, all  -SS     [place] Level/Cues Needed (Bed Mobility Goal 1, PT)  conditional independence  -SS     Time Frame (Bed Mobility Goal 1, PT)  long term goal (LTG);2 weeks  -     Row Name 08/21/19 153          Transfer Goal 1 (PT)    Activity/Assistive Device (Transfer Goal 1, PT)  transfers, all  -SS     [place] Level/Cues Needed (Transfer Goal 1, PT)  conditional independence  -SS     Time Frame (Transfer Goal 1, PT)  long term goal (LTG);2 weeks  -     Row Name 08/21/19 6321          Gait Training Goal 1 (PT)    Activity/Assistive Device (Gait Training Goal 1, PT)  gait (walking locomotion);walker, rolling  -SS     [place] Level (Gait Training Goal 1, PT)  conditional independence  -SS     Distance (Gait Goal 1, PT)  150  -SS     Time Frame (Gait Training Goal 1, PT)  long term goal (LTG);2 weeks  -       User Key  (r) = Recorded By, (t) = Taken By, (c) = Cosigned By    Initials Name Provider Type     Josefa Vieyra, PT Physical Therapist    Apple Conrad RN Registered Nurse        Physical Therapy Education     Title: PT OT SLP Therapies (Done)     Topic: Physical Therapy (Done)     Point: Mobility training (Done)     Learning Progress Summary           Patient Acceptance, E, VU by  at 8/21/2019   4:14 PM                               User Key     Initials Effective Dates Name Provider Type Discipline     06/19/19 -  Josefa Vieyra PT Physical Therapist PT              PT Recommendation and Plan  Anticipated Discharge Disposition (PT): home with OP services([facility])  Planned Therapy Interventions (PT Eval): balance training, bed mobility training, gait training, home exercise program, patient/family education, ROM (range of motion), stair training, strengthening, transfer training  Therapy Frequency (PT Clinical Impression): 2 times/day  Outcome Summary/Treatment Plan (PT)  Anticipated Equipment Needs at Discharge (PT): front wheeled walker  Anticipated Discharge Disposition (PT): home with OP services([facility])  Plan of Care Reviewed With: patient  Outcome Summary: 65 y/o F s/p R ant SHANDA per Dr. Tolentino POD 0. Pt performs bed mobility, transfers and ambulation with CGA. Pt limited this date by nausea and dizziness as session progressed. BP WNL. Pt plans to d/c to her daughters house with 24/7 assist and OP PT. Only 1 WALTER.  Will follow 2x daily while IP at [facility].      Time Calculation:   PT Charges     Row Name 08/21/19 1614             Time Calculation    Start Time  1535  -      Stop Time  1602  -      Time Calculation (min)  27 min  -SS      PT Received On  08/21/19  -      PT - Next Appointment  08/22/19  -      PT Goal Re-Cert Due Date  09/04/19  -        User Key  (r) = Recorded By, (t) = Taken By, (c) = Cosigned By    Initials Name Provider Type     Josefa Vieyra PT Physical Therapist        Therapy Charges for Today     Code Description Service Date Service Provider Modifiers Qty    31007209610 HC PT EVAL MOD COMPLEXITY 4 8/21/2019 Josefa Vieyra, PT  1    11944915236 HC GAIT TRAINING EA 15 MIN 8/21/2019 Josefa Vieyra, PT  1                 Josefa Vieyra PT  8/21/2019          Electronically signed by Josefa Vieyra PT at 8/21/2019  4:15  PM       Occupational Therapy Notes (last 24 hours) (Notes from 8/21/2019 10:43 AM through 8/22/2019 10:43 AM)     No notes of this type exist for this encounter.        Discharge Order (From admission, onward)    None

## 2019-08-22 NOTE — CONSULTS
Referring Provider: Daniel Tolentino MD  Reason for Consultation:  medical management    Patient Care Team:  Jay Durán MD as PCP - General (Family Medicine)    Chief complaint right hip pain        History of present illness:    The patient is a 64-year-old female who was brought into the hospital for elective right anterior total hip replacement.  She did well postoperatively and this morning she is anxious to go home.  Patient reports that she sustained some bruising of her mouth during surgery reportedly due to a difficult intubation.  She denies any pain.    Review of Systems:  12 point review of systems was reviewed and was negative except as above.      History  Past Medical History:   Diagnosis Date   • Anxiety    • Arthritis    • Chronic headaches     much improved after hysterectomy   • Dysphagia     at times   • Glaucoma    • Rash     wrists and hands with excessive washing   • Right hip pain    ,   Past Surgical History:   Procedure Laterality Date   •  SECTION     • HYSTERECTOMY     • SKIN LESION EXCISION     • TONSILLECTOMY     ,   Family History   Problem Relation Age of Onset   • Cancer Mother    • Diabetes Maternal Grandmother    • Heart disease Maternal Grandmother    • Cancer Maternal Grandmother    ,   Social History     Tobacco Use   • Smoking status: Never Smoker   • Smokeless tobacco: Never Used   Substance Use Topics   • Alcohol use: No     Frequency: Never   • Drug use: No    and Allergies:  Latex    Objective     Vital Signs   Temp:  [97.6 °F (36.4 °C)-98.4 °F (36.9 °C)] 97.6 °F (36.4 °C)  Heart Rate:  [68-79] 79  Resp:  [14-23] 14  BP: (118-167)/(57-73) 118/57    Physical Exam:  Well-developed well-nourished female sitting up in the chair awake and alert no acute distress; she has ecchymosis at the corners of the mouth bilaterally, mucous membranes moist; lungs clear to auscultation bilaterally; CV regular rate and rhythm; abdomen soft nontender; extremities with no  edema or calf tenderness; palpable pedal pulses bilaterally.    Results Review:  Imaging Results (last 24 hours)     Procedure Component Value Units Date/Time    XR Hip With or Without Pelvis 1 View Right [[id number]] Updated:  08/21/19 1424    FL C Arm During Surgery [[id number]] Resulted:  08/21/19 1423     Updated:  08/21/19 1423    Narrative:       This procedure was auto-finalized with no dictation required.    XR Pelvis 1 or 2 View [[id number]] Collected:  08/21/19 1241     Updated:  08/21/19 1244    Narrative:       DATE OF EXAM:  8/21/2019 12:25 PM     PROCEDURE:  XR PELVIS 1 OR 2 VW-     INDICATIONS:  ARTHRITIS, HIP . Postop right hip replacement.     COMPARISON:  AP pelvis and right hip radiograph 08/05/2019.     TECHNIQUE:   Single AP view the pelvis and right hip (one image).      FINDINGS:  New total right hip replacement changes are present. The prosthesis is  appropriately seated. No periprosthetic fracture seen. Slight degree of  right hip subcutaneous air and soft tissue swelling postoperatively.     Left hip appears unremarkable. Pelvic ring appears intact. No sacroiliac  joint or pubic symphysis diastases.       Impression:       Satisfactory postoperative appearance status post right hip replacement.     Electronically Signed By-Dr. Yvette Dow MD On:8/21/2019 12:42 PM  This report was finalized on [id number] by Dr. Yvette Dow MD.        Lab Results (last 24 hours)     Procedure Component Value Units Date/Time    Basic Metabolic Panel [[id number]]  (Abnormal) Collected:  08/22/19 0317    Specimen:  Blood Updated:  08/22/19 0416     Glucose 151 mg/dL      BUN 11 mg/dL      Creatinine 0.80 mg/dL      Sodium 138 mmol/L      Potassium 3.7 mmol/L      Chloride 104 mmol/L      CO2 23.0 mmol/L      Calcium 8.3 mg/dL      eGFR Non African Amer 72 mL/min/1.73      BUN/Creatinine Ratio 13.8     Anion Gap 14.7 mmol/L     Hemoglobin & Hematocrit, Blood [[id number]]  (Abnormal) Collected:  08/22/19 0317     Specimen:  Blood Updated:  08/22/19 0352     Hemoglobin 9.3 g/dL      Hematocrit 27.5 %         No orders to display         Assessment/Plan       Primary osteoarthritis of right hip    Primary hypertension, chronic and controlled  -Continue home medication    History of depression  -Continue Lexapro    Osteoarthritis of the right hip status post right total hip replacement, postop day 1 and doing well    Acute blood loss anemia related to surgery  -Continue oral iron supplements    Xiomy Noe MD  08/22/19  10:33 AM

## 2019-08-22 NOTE — PLAN OF CARE
Problem: Patient Care Overview  Goal: Plan of Care Review  Outcome: Ongoing (interventions implemented as appropriate)   08/22/19 1129   OTHER   Outcome Summary Pt POD#1 right ATHA. Pt completes SHANDA protocol exercises in standing and seated position. Pt able to ambulate using RW with SUP. Stair training completed on one curb and with ascending/descending 4 steps using one handrail and SBA. Dtr present for training. Continue plan for home with dtr, use of RW, and OP PT.    Coping/Psychosocial   Plan of Care Reviewed With patient   Plan of Care Review   Progress improving

## 2019-08-22 NOTE — THERAPY TREATMENT NOTE
Acute Care - Physical Therapy Treatment Note   [facility]     Patient Name: Savannah Bruner  : 1955  MRN: [id number]  Today's Date: 2019             Admit Date: 2019    Visit Dx:    ICD-10-CM ICD-9-CM   1. Primary osteoarthritis of right hip M16.11 715.15     Patient Active Problem List   Diagnosis   • Primary osteoarthritis of right hip   • Polyarthropathy or polyarthritis   • Anxiety   • Chronic angle-closure glaucoma   • Depressive disorder   • Hyperlipidemia   • Hypertension, benign   • Primary osteoarthritis of right knee   • Overweight (BMI 25.0-29.9)       Therapy Treatment    Rehabilitation Treatment Summary     Row Name 19 1058             Treatment Time/Intention    Discipline  physical therapist  -HD      Document Type  therapy note (daily note)  -HD      Subjective Information  complains of;pain  -HD      Recorded by [HD] Dory Marquis, PT 19 1059      Row Name 19 1058             Cognitive Assessment/Intervention- PT/OT    Orientation Status (Cognition)  oriented x 4  -HD      Recorded by [HD] Dory Marquis, PT 19 1059      Row Name 19 1058             Mobility Assessment/Intervention    Extremity Weight-bearing Status  right lower extremity  -HD      Right Lower Extremity (Weight-bearing Status)  weight-bearing as tolerated (WBAT)  -HD      Recorded by [HD] Dory Marquis, PT 19 1104      Row Name 19 1058             Transfer Assessment/Treatment    Transfer Assessment/Treatment  sit-stand transfer;stand-sit transfer  -HD      Recorded by [HD] Dory Marquis, PT 19 1104      Row Name 19 1058             Sit-Stand Transfer    Sit-Stand [place] (Transfers)  stand by assist  -HD      Assistive Device (Sit-Stand Transfers)  walker, front-wheeled  -HD      Recorded by [HD] Dory Marquis, PT 19 1104      Row Name 19 1058             Stand-Sit Transfer    Stand-Sit [place] (Transfers)  stand by assist  -HD       Assistive Device (Stand-Sit Transfers)  walker, front-wheeled  -HD      Recorded by [HD] Dory Marquis, PT 08/22/19 1104      Row Name 08/22/19 1058             Gait/Stairs Assessment/Training    Gait/Stairs Assessment/Training  gait/ambulation independence;gait/ambulation assistive device  -HD      [place] Level (Gait)  stand by assist  -HD      Assistive Device (Gait)  walker, front-wheeled  -HD      Distance in Feet (Gait)  150 ft  -HD2      Negotiation (Stairs)  stairs independence;stairs assistive device  -HD2      [place] Level (Stairs)  stand by assist  -HD2      Comment (Gait/Stairs)  stair training completed on one curb using RW and ascending/descending 4 steps using one handrail with SBA  -HD2      Recorded by [HD] Dory Marquis, PT 08/22/19 1104  [HD2] Dory Marquis, PT 08/22/19 1129      Row Name 08/22/19 1058             Motor Skills Assessment/Interventions    Additional Documentation  Therapeutic Exercise Interventions (Group);Therapeutic Exercise (Group)  -HD      Recorded by [HD] Dory Marquis, PT 08/22/19 1104      Row Name 08/22/19 1058             Therapeutic Exercise    Comment (Therapeutic Exercise)  seated right LE ATHA protocol  -HD      Recorded by [HD] Dory Marquis, PT 08/22/19 1104      Row Name 08/22/19 1058             Positioning and Restraints    Pre-Treatment Position  sitting in chair/recliner  -HD      Post Treatment Position  chair  -HD      Recorded by [HD] Dory Marquis, PT 08/22/19 1059      Row Name 08/22/19 1058             Pain Assessment    Additional Documentation  Pain Scale: Numbers Pre/Post-Treatment (Group)  -HD      Recorded by [HD] Dory Marquis, PT 08/22/19 1059      Row Name 08/22/19 1058             Pain Scale: Numbers Pre/Post-Treatment    Pain Scale: Numbers, Pretreatment  4/10  -HD      Pain Scale: Numbers, Post-Treatment  5/10  -HD2      Pre/Post Treatment Pain Comment  right hip   -HD3      Recorded by [HD] Dory Marquis, PT 08/22/19  1059  [HD2] Dory Maqruis, PT 08/22/19 1129  [HD3] Dory Marquis, PT 08/22/19 1104      Row Name                Wound 08/21/19 1155 Right hip Incision    Wound - Properties Group Date first assessed: 08/21/19 [SE] Time first assessed: 1155 [SE] Side: Right [SE] Location: hip [SE] Primary Wound Type: Incision [SE] Recorded by:  [SE] Apple Morrissey, RN 08/21/19 1155    Row Name 08/22/19 1058             Plan of Care Review    Plan of Care Reviewed With  patient  -HD      Recorded by [HD] Dory Marquis, PT 08/22/19 1059      Row Name 08/22/19 1058             Outcome Summary/Treatment Plan (PT)    Anticipated Discharge Disposition (PT)  home with assist;home with OP services  -HD      Recorded by [HD] Dory Marquis, PT 08/22/19 1059        User Key  (r) = Recorded By, (t) = Taken By, (c) = Cosigned By    Initials Name Effective Dates Discipline    HD Dory Marquis, PT 03/01/19 -  PT    SE Apple Morrissey, RN 03/01/19 -  Nurse          Wound 08/21/19 1155 Right hip Incision (Active)   Dressing Appearance dry;intact;no drainage 8/22/2019  3:19 AM   Closure DILCIA 8/22/2019  3:19 AM   Drainage Amount none 8/22/2019  3:19 AM           Physical Therapy Education     Title: PT OT SLP Therapies (Done)     Topic: Physical Therapy (Done)     Point: Mobility training (Done)     Learning Progress Summary           Patient Acceptance, E, VU by OLGA at 8/22/2019 11:04 AM    Acceptance, TB,E, VU by SS at 8/22/2019 12:52 AM    Acceptance, E, VU by SS1 at 8/21/2019  4:14 PM   Family Acceptance, TB,E, VU by SS at 8/22/2019 12:52 AM   Significant Other Acceptance, TB,E, VU by SS at 8/22/2019 12:52 AM                   Point: Home exercise program (Done)     Learning Progress Summary           Patient Acceptance, E, VU by OLGA at 8/22/2019 11:04 AM                   Point: Body mechanics (Done)     Learning Progress Summary           Patient Acceptance, E, VU by OLGA at 8/22/2019 11:04 AM                   Point: Precautions (Done)      Learning Progress Summary           Patient Acceptance, E, VU by HD at 8/22/2019 11:04 AM                               User Key     Initials Effective Dates Name Provider Type Discipline    SS1 06/19/19 -  Josefa Vieyra, PT Physical Therapist PT     03/01/19 -  Dory Marquis, PT Physical Therapist PT     03/01/19 -  Apple Zhao, RN Registered Nurse Nurse                PT Recommendation and Plan  Anticipated Discharge Disposition (PT): home with assist, home with OP services  Outcome Summary/Treatment Plan (PT)  Anticipated Discharge Disposition (PT): home with assist, home with OP services  Plan of Care Reviewed With: patient  Progress: improving  Outcome Summary: Pt POD#1 right ATHA.  Pt completes SHANDA protocol exercises in standing and seated position.  Pt able to ambulate using RW with SUP.  Stair training completed on one curb and with ascending/descending 4 steps using one handrail and SBA.  Dtr present for training.  Continue plan for home with dtr, use of RW, and OP PT.      Time Calculation:   PT Charges     Row Name 08/22/19 1129             Time Calculation    Start Time  1058  -HD      Stop Time  1129  -HD      Time Calculation (min)  31 min  -HD      PT Received On  08/22/19  -HD      PT - Next Appointment  08/22/19  -HD         Time Calculation- PT    Total Timed Code Minutes- PT  31 minute(s)  -HD        User Key  (r) = Recorded By, (t) = Taken By, (c) = Cosigned By    Initials Name Provider Type     Dory Marquis, PT Physical Therapist        Therapy Charges for Today     Code Description Service Date Service Provider Modifiers Qty    58062326509 HC GAIT TRAINING EA 15 MIN 8/22/2019 Dory Marquis, PT  1    35323372735 HC PT THER PROC EA 15 MIN 8/22/2019 Dory Marquis, PT  1    38740964492 HC PT THERAPEUTIC ACT EA 15 MIN 8/22/2019 Dory Marquis, PT  1               Dory Marquis, PT  8/22/2019

## 2019-08-23 ENCOUNTER — READMISSION MANAGEMENT (OUTPATIENT)
Dept: CALL CENTER | Facility: HOSPITAL | Age: 64
End: 2019-08-23

## 2019-08-23 NOTE — OUTREACH NOTE
Prep Survey      Responses   Facility patient discharged from?  [facility]   Is patient eligible?  No   What are the reasons patient is not eligible?  Other   Discharge diagnosis  Primary osteoarthritis of right hip   Does the patient have one of the following disease processes/diagnoses(primary or secondary)?  Total Joint Replacement   Does the patient have Home health ordered?  No   Is there a DME ordered?  Yes   What DME was ordered?  [place]   Comments regarding appointments  Outpatient PT at [facility]   Prep survey completed?  Yes          Gabby Jackson RN

## 2019-08-26 ENCOUNTER — TELEPHONE (OUTPATIENT)
Dept: SURGERY | Facility: HOSPITAL | Age: 64
End: 2019-08-26

## 2019-08-26 NOTE — TELEPHONE ENCOUNTER
Patient doing well.  Leg and foot have been swollen.  Propping leg up and using ice PRN.  Encouraged elevating feet on 3 pillows a few times a day.  Pain isn't too bad.  Starting OP PT tomorrow.  Encouraged to take tylenol if needed when not taking pain meds.  No needs at this time.

## 2019-08-27 ENCOUNTER — TELEPHONE (OUTPATIENT)
Dept: FAMILY MEDICINE CLINIC | Facility: CLINIC | Age: 64
End: 2019-08-27

## 2019-08-27 NOTE — TELEPHONE ENCOUNTER
Patient is requesting a note to  Excuse her from work for 8/11/19, 8/14/19, and 8/18/19 to take care of her mother before surgery. Please call patient when completed.

## 2019-08-27 NOTE — TELEPHONE ENCOUNTER
Pt notified there are forms up front that have been filled out for her and are ready to be picked up.

## 2019-09-05 ENCOUNTER — OFFICE VISIT (OUTPATIENT)
Dept: ORTHOPEDIC SURGERY | Facility: CLINIC | Age: 64
End: 2019-09-05

## 2019-09-05 VITALS
BODY MASS INDEX: 27.34 KG/M2 | HEART RATE: 66 BPM | WEIGHT: 148.6 LBS | DIASTOLIC BLOOD PRESSURE: 71 MMHG | SYSTOLIC BLOOD PRESSURE: 122 MMHG | HEIGHT: 62 IN

## 2019-09-05 DIAGNOSIS — E66.3 OVERWEIGHT (BMI 25.0-29.9): ICD-10-CM

## 2019-09-05 DIAGNOSIS — Z96.641 HISTORY OF TOTAL RIGHT HIP REPLACEMENT: Primary | ICD-10-CM

## 2019-09-05 PROCEDURE — 99024 POSTOP FOLLOW-UP VISIT: CPT | Performed by: PHYSICIAN ASSISTANT

## 2019-09-05 NOTE — PROGRESS NOTES
"     Subjective:    HPI:  Savannah Bruner is a 64 y.o. female who presents about 2 weeks out from having a right anterior total hip replacement.  She reports that she is doing well with mild intermittent discomfort.  She does report some numbness in the thigh area.       Objective   Objective:    /71   Pulse 66   Ht 157.5 cm (62\")   Wt 67.4 kg (148 lb 9.6 oz)   BMI 27.18 kg/m²     Physical Examination:  Alert, oriented, overweight individual in no acute distress, ambulating with the assistance of a walker  Right lower extremity shows a well-healing surgical incision with no erythema, drainage, or open skin lesions. There is a minimal amount of swelling in the thigh area. It is grossly well aligned, and the patient is neurovascularly intact distally. Plantar and dorsiflexion is 5/5. There is mild tenderness to palpation and with range of motion, which is smooth.         Imaging:  Imaging done today shows The prosthesis appears in good alignment with no bony or implant failure.  No fractures or dislocations are appreciated.          Assessment:  1. History of total right hip replacement    2. Overweight (BMI 25.0-29.9)       About 2 weeks out from surgery          Plan:  At this time, we will plan to see the patient back in about 6 weeks. The patient should continue PT, WBAT, and call with any problems.               JUSTO Arriaga  09/05/19  9:35 AM                      "

## 2019-09-05 NOTE — PATIENT INSTRUCTIONS
Total Hip Replacement, Anterior, Care After  This sheet gives you information about how to care for yourself after your procedure. Your doctor may also give you more specific instructions. If you have problems or questions, contact your doctor.  What can I expect after the procedure?  After the procedure, it is common to have:  · Pain.  · Stiffness.  · Discomfort.  Follow these instructions at home:  Medicines  · Take over-the-counter and prescription medicines only as told by your doctor.  · If you were prescribed a medicine to thin your blood (anticoagulant), take it as told by your doctor.  Surgery cut care    · Follow instructions from your doctor about how to take care of your cut (incision) from surgery. Make sure you:  ? Wash your hands with soap and water before you change your bandage (dressing). If you cannot use soap and water, use alcohol-based hand .  ? Change your bandage as told by your doctor.  ? Leave stitches (sutures), skin glue, or skin tape (adhesive) strips in place. They may need to stay in place for 2 weeks or longer. If tape strips get loose and curl up, you may trim the loose edges. Do not remove tape strips completely unless your doctor tells you to do that.  · Check your surgical cut every day for signs of infection. Check for:  ? Redness, swelling, or pain.  ? Fluid or blood.  ? Warmth.  ? Pus or a bad smell.  Bathing  · Do not take baths, swim, or use a hot tub until your doctor says it is okay.  · Keep the bandage dry until your doctor says it can be removed.  Managing pain, stiffness, and swelling    · If directed, put ice on the hip area.  ? Put ice in a plastic bag.  ? Place a towel between your skin and the bag.  ? Leave the ice on for 20 minutes, 2-3 times a day.  · Move your toes often to avoid stiffness and to lessen swelling.  · Raise (elevate) your leg above the level of your heart while you are sitting or lying down.  Activity  · Rest as told by your doctor.  · Do  not sit for a long time without moving. Get up to take short walks every 1-2 hours. This is important. Ask for help if you feel weak or unsteady.  · Do exercises as told by your doctor or physical therapist.  · Use a walker, crutches, or a cane as told by your doctor.  ? You may use your legs to support (bear) your body weight as told by your doctor. Follow instructions about how much weight you may safely support on your affected leg (weight-bearing restrictions).  ? A physical therapist may show you how to get out of a bed and chair and how to go up and down stairs. You will first do this with a walker, crutches, or a cane. Then you will do it without any of these devices.  ? Once you are able to walk without a limp, you may stop using a walker, crutches, or cane.  · Return to your normal activities as told by your doctor. Ask your doctor what activities are safe for you.  Safety  · To help prevent falls:  ? Keep floors clear of objects you may trip over.  ? Place items that you may need within easy reach.  · Wear an apron or tool belt with pockets for carrying objects. This leaves your hands free to help with your balance.  Driving  · Do not drive or use heavy machinery while taking prescription pain medicine.  · Ask your doctor when it is safe to drive.  General instructions  · Wear compression stockings as told by your doctor. These help to prevent blood clots and reduce swelling in your legs.  · Keep doing breathing exercises as told by your doctor. This helps prevent lung infection.  · If you are taking prescription pain medicine, take actions to prevent or treat constipation. Your doctor may suggest that you:  ? Drink enough fluid to keep your pee (urine) pale yellow.  ? Eat foods that are high in fiber. These include fresh fruits and vegetables, whole grains, and beans.  ? Limit foods that are high in fat and sugar. These include fried or sweet foods.  ? Take an over-the-counter or prescription medicine for  constipation.  · Do not use any products that have nicotine or tobacco in them, such as cigarettes and e-cigarettes. These can delay bone healing. If you need help quitting, ask your doctor.  · Keep all follow-up visits as told by your doctor. This is important.  Contact a doctor if:  · You have a fever or chills.  · You have a cough.  · You feel short of breath.  · Your medicine is not helping your pain.  · You have any of these at or near your cut from surgery:  ? Redness, swelling, or pain.  ? Fluid or blood.  ? An area that feels warm when you touch it.  ? Pus or a bad smell.  Get help right away if:  · You have very bad pain.  · You have trouble breathing.  · You have chest pain.  · You have redness, swelling, pain, and warmth in your calf or leg.  Summary  · Follow instructions from your doctor about how to take care of your surgery cut (incision).  · Do not take baths, swim, or use a hot tub until your doctor says it is okay.  · Use crutches, a walker, or a cane as told by your doctor.  · If you were prescribed a medicine to thin your blood (anticoagulant), take it as told by your doctor.  This information is not intended to replace advice given to you by your health care provider. Make sure you discuss any questions you have with your health care provider.  Document Released: 04/03/2019 Document Revised: 04/03/2019 Document Reviewed: 04/03/2019  [employer] Interactive Patient Education © 2019 [employer]      Preventing Health Risks of Being Overweight  Maintaining a healthy body weight is an important part of your overall health. Your healthy body weight depends on your age, gender, and height. Being overweight puts you at risk for many health problems, including:  · Heart disease.  · Diabetes.  · Problems sleeping.  · Joint problems.  You can make changes to your diet and lifestyle to prevent these risks. Consider working with a health care provider or a dietitian to make these changes.  What nutrition  changes can be made?    · Eat only as much as your body needs. In most cases, this is about 2,000 calories a day, but the amount varies depending on your height, gender, and activity level. Ask your health care provider how many calories you should have each day. Eating more than your body needs on a regular basis can cause you to become overweight or obese.  · Eat slowly, and stop eating when you feel full.  · Choose healthy foods, including:  ? Fruits and vegetables.  ? Lean meats.  ? Low-fat dairy products.  ? High-fiber foods, such as whole grains and beans.  ? Healthy snacks like vegetable sticks, a piece of fruit, or a small amount of yogurt or cheese.  · Avoid foods and drinks that are high in sugar, salt (sodium), saturated fat, or trans fat. This includes:  ? Many desserts such as candy, cookies, and ice cream.  ? Soda.  ? Fried foods.  ? Processed meats such as hot dogs or lunch meats.  ? Prepackaged snack foods.  What lifestyle changes can be made?    · Exercise for at least 150 minutes a week to prevent weight gain, or as often as recommended by your health care provider. Do moderate-intensity exercise, such as brisk walking.  ? Spread it out by exercising for 30 minutes 5 days a week, or in short 10-minute bursts several times a day.  · Find other ways to stay active and burn calories, such as yard work or a hobby that involves physical activity.  · Get at least 8 hours of sleep each night. When you are well-rested, you are more likely to be active and make healthy choices during the day. To sleep better:  ? Try to go to bed and wake up at about the same time every day.  ? Keep your bedroom dark, quiet, and cool.  ? Make sure that your bed is comfortable.  ? Avoid stimulating activities, such as watching television or exercising, for at least one hour before bedtime.  Why are these changes important?  Eating healthy and being active helps you lose weight and prevent health problems caused by being  overweight. Making these changes can also help you manage stress, feel better mentally, and connect with friends and family.  What can happen if changes are not made?  Being overweight can affect you for your entire life. You may develop joint or bone problems that make it painful or difficult for you to play sports or do activities you enjoy. Being overweight puts stress on your heart and lungs and can lead to medical problems like diabetes, heart disease, and sleeping problems.  Where to find support  You can get support for preventing health risks of being overweight from:  · Your health care provider or a dietitian. They can provide guidance about healthy eating and healthy lifestyle choices.  · Weight loss support groups, online or in-person.  Where to find more information  · MyPlate: www.[employer]myplate.gov  ? This an online tool that provides personalized recommendations about foods to eat each day.  · The Centers for Disease Control and Prevention: www.cdc.gov/healthyweight  ? This resource gives tips for managing weight and having an active lifestyle.  Summary  · To prevent unhealthy weight gain, it is important to maintain a healthy diet high in vegetables and whole grains, exercise regularly, and get at least 8 hours of sleep each night.  · Making these changes helps prevent many long-term (chronic) health conditions that can shorten your life, such as diabetes, heart disease, and stroke.  This information is not intended to replace advice given to you by your health care provider. Make sure you discuss any questions you have with your health care provider.  Document Released: 11/14/2018 Document Revised: 11/14/2018 Document Reviewed: 11/14/2018  [employer] Interactive Patient Education © 2019 [employer]

## 2019-10-14 ENCOUNTER — OFFICE VISIT (OUTPATIENT)
Dept: ORTHOPEDIC SURGERY | Facility: CLINIC | Age: 64
End: 2019-10-14

## 2019-10-14 ENCOUNTER — TELEPHONE (OUTPATIENT)
Dept: ORTHOPEDIC SURGERY | Facility: CLINIC | Age: 64
End: 2019-10-14

## 2019-10-14 VITALS
HEART RATE: 72 BPM | SYSTOLIC BLOOD PRESSURE: 158 MMHG | HEIGHT: 62 IN | WEIGHT: 144.2 LBS | DIASTOLIC BLOOD PRESSURE: 72 MMHG | BODY MASS INDEX: 26.54 KG/M2

## 2019-10-14 DIAGNOSIS — Z96.641 HISTORY OF TOTAL RIGHT HIP REPLACEMENT: Primary | ICD-10-CM

## 2019-10-14 DIAGNOSIS — E66.3 OVERWEIGHT (BMI 25.0-29.9): ICD-10-CM

## 2019-10-14 PROCEDURE — 99024 POSTOP FOLLOW-UP VISIT: CPT | Performed by: PHYSICIAN ASSISTANT

## 2019-11-15 RX ORDER — ESCITALOPRAM OXALATE 10 MG/1
TABLET ORAL
Qty: 90 TABLET | Refills: 3 | Status: SHIPPED | OUTPATIENT
Start: 2019-11-15 | End: 2020-09-10

## 2020-09-09 DIAGNOSIS — F41.9 ANXIETY: Primary | ICD-10-CM

## 2020-09-10 RX ORDER — ESCITALOPRAM OXALATE 10 MG/1
TABLET ORAL
Qty: 30 TABLET | Refills: 0 | Status: SHIPPED | OUTPATIENT
Start: 2020-09-10 | End: 2020-09-18 | Stop reason: SDUPTHER

## 2020-09-18 DIAGNOSIS — F41.9 ANXIETY: ICD-10-CM

## 2020-09-18 RX ORDER — ESCITALOPRAM OXALATE 10 MG/1
TABLET ORAL
Qty: 45 TABLET | Refills: 0 | Status: SHIPPED | OUTPATIENT
Start: 2020-09-18 | End: 2020-11-19 | Stop reason: SDUPTHER

## 2020-11-19 ENCOUNTER — TELEPHONE (OUTPATIENT)
Dept: FAMILY MEDICINE CLINIC | Facility: CLINIC | Age: 65
End: 2020-11-19

## 2020-11-19 DIAGNOSIS — F41.9 ANXIETY: ICD-10-CM

## 2020-11-19 RX ORDER — ESCITALOPRAM OXALATE 10 MG/1
TABLET ORAL
Qty: 45 TABLET | Refills: 0 | Status: SHIPPED | OUTPATIENT
Start: 2020-11-19 | End: 2020-11-23 | Stop reason: SDUPTHER

## 2020-11-19 NOTE — TELEPHONE ENCOUNTER
I made her an appointment for Monday for med refill. She is in need of a refill on escitalopram 10 mg to be sent to [facility] in [place].

## 2020-11-23 ENCOUNTER — OFFICE VISIT (OUTPATIENT)
Dept: FAMILY MEDICINE CLINIC | Facility: CLINIC | Age: 65
End: 2020-11-23

## 2020-11-23 VITALS
RESPIRATION RATE: 18 BRPM | DIASTOLIC BLOOD PRESSURE: 80 MMHG | HEART RATE: 96 BPM | WEIGHT: 147.6 LBS | HEIGHT: 65 IN | BODY MASS INDEX: 24.59 KG/M2 | SYSTOLIC BLOOD PRESSURE: 160 MMHG | OXYGEN SATURATION: 97 % | TEMPERATURE: 98 F

## 2020-11-23 DIAGNOSIS — Z23 NEED FOR INFLUENZA VACCINATION: ICD-10-CM

## 2020-11-23 DIAGNOSIS — F32.A DEPRESSIVE DISORDER: ICD-10-CM

## 2020-11-23 DIAGNOSIS — M79.675 PAIN OF TOE OF LEFT FOOT: Primary | ICD-10-CM

## 2020-11-23 DIAGNOSIS — F41.9 ANXIETY: ICD-10-CM

## 2020-11-23 DIAGNOSIS — E66.3 OVERWEIGHT (BMI 25.0-29.9): ICD-10-CM

## 2020-11-23 PROCEDURE — 99213 OFFICE O/P EST LOW 20 MIN: CPT | Performed by: FAMILY MEDICINE

## 2020-11-23 PROCEDURE — 90694 VACC AIIV4 NO PRSRV 0.5ML IM: CPT | Performed by: FAMILY MEDICINE

## 2020-11-23 PROCEDURE — G0008 ADMIN INFLUENZA VIRUS VAC: HCPCS | Performed by: FAMILY MEDICINE

## 2020-11-23 RX ORDER — ESCITALOPRAM OXALATE 10 MG/1
TABLET ORAL
Qty: 45 TABLET | Refills: 12 | Status: SHIPPED | OUTPATIENT
Start: 2020-11-23 | End: 2021-12-21 | Stop reason: SDUPTHER

## 2020-11-23 RX ORDER — BIMATOPROST 0.3 MG/ML
1 SOLUTION/ DROPS OPHTHALMIC DAILY
COMMUNITY
Start: 2020-09-10

## 2020-11-23 NOTE — PROGRESS NOTES
Subjective   Savannah Bruner is a 65 y.o. female.     Medication refill. Needs refill on lexapro. Stable     Toe Pain   The incident occurred 5 to 7 days ago. There was no injury mechanism. The pain is present in the right toes. The pain is at a severity of 4/10. The pain is mild. The pain has been intermittent since onset. Pertinent negatives include no inability to bear weight, numbness or tingling. She reports no foreign bodies present. The symptoms are aggravated by palpation. She has tried nothing for the symptoms. The treatment provided no relief.        The following portions of the patient's history were reviewed and updated as appropriate: allergies, current medications, past family history, past medical history, past social history, past surgical history and problem list.    Patient Active Problem List   Diagnosis   • Polyarthropathy or polyarthritis   • Anxiety   • Chronic angle-closure glaucoma   • Depressive disorder   • Hyperlipidemia   • Hypertension, benign   • Primary osteoarthritis of right knee   • Overweight (BMI 25.0-29.9)   • History of total right hip replacement       Current Outpatient Medications on File Prior to Visit   Medication Sig Dispense Refill   • bimatoprost (LUMIGAN) 0.03 % ophthalmic drops Administer 1 drop to both eyes Daily.     • Ferrous Sulfate (IRON) 28 MG tablet Take  by mouth Daily.     • [DISCONTINUED] escitalopram (LEXAPRO) 10 MG tablet Take 1.5 tablet daily 45 tablet 0   • LUMIGAN 0.01 % ophthalmic drops INSTILL 1 DROP INTO EACH EYE daily  6   • [DISCONTINUED] B Complex Vitamins (VITAMIN B COMPLEX) capsule capsule Take 1 capsule by mouth 2 (Two) Times a Day.     • [DISCONTINUED] ferrous sulfate 324 (65 Fe) MG tablet delayed-release EC tablet Take 324 mg by mouth Daily With Breakfast.     • [DISCONTINUED] glucosamine sulfate 500 MG capsule capsule Take  by mouth Daily.       No current facility-administered medications on file prior to visit.      Current outpatient and  discharge medications have been reconciled for the patient.  Reviewed by: Jay Durán MD      Allergies   Allergen Reactions   • Latex Itching     Hands itch not diagnosed       Review of Systems   Constitutional: Negative for activity change, appetite change, fatigue and fever.   HENT: Negative for ear pain, swollen glands and voice change.    Eyes: Negative for visual disturbance.   Respiratory: Negative for shortness of breath and wheezing.    Cardiovascular: Negative for chest pain and leg swelling.   Gastrointestinal: Negative for abdominal pain, blood in stool, constipation, diarrhea, nausea and vomiting.   Endocrine: Negative for polydipsia and polyuria.   Genitourinary: Negative for dysuria, frequency and hematuria.   Musculoskeletal: Negative for joint swelling, neck pain and neck stiffness.   Skin: Negative for rash and bruise.   Neurological: Negative for tingling, weakness, numbness and headache.   Psychiatric/Behavioral: Negative for suicidal ideas and depressed mood.     I have reviewed and confirmed the accuracy of the ROS as documented by the MA/LPN/RN Jay Durán MD      Objective   Vitals:    11/23/20 1345   BP: 160/80   Pulse: 96   Resp: 18   Temp: 98 °F (36.7 °C)   SpO2: 97%     Physical Exam  Constitutional:       Appearance: She is well-developed.   HENT:      Head: Normocephalic and atraumatic.      Right Ear: External ear normal.      Left Ear: External ear normal.      Nose: Nose normal.   Eyes:      Pupils: Pupils are equal, round, and reactive to light.   Neck:      Musculoskeletal: Normal range of motion and neck supple.   Cardiovascular:      Rate and Rhythm: Normal rate and regular rhythm.      Heart sounds: Normal heart sounds.   Pulmonary:      Effort: Pulmonary effort is normal.      Breath sounds: Normal breath sounds.   Abdominal:      General: Bowel sounds are normal.      Palpations: Abdomen is soft.   Musculoskeletal: Normal range of motion.   Skin:      General: Skin is warm and dry.      Comments: Left foot 4th toe plantar aspect with callous.    Neurological:      Mental Status: She is alert and oriented to person, place, and time.   Psychiatric:         Behavior: Behavior normal.         Thought Content: Thought content normal.         Judgment: Judgment normal.       I wore protective equipment throughout this patient encounter to include mask and eye protection. Hand hygiene was performed before donning protective equipment and after removal when leaving the room.    Assessment/Plan .  Problem List Items Addressed This Visit     Anxiety    Overview     stable         Relevant Medications    escitalopram (LEXAPRO) 10 MG tablet    Depressive disorder    Current Assessment & Plan     Stable on current tx           Relevant Medications    escitalopram (LEXAPRO) 10 MG tablet    Overweight (BMI 25.0-29.9)      Other Visit Diagnoses     Pain of toe of left foot    -  Primary    callous 4th toe    Need for influenza vaccination        Relevant Orders    Fluad Quad >65 years       Findings discussed. All questions answered.  Medication and medication adverse effects discussed.  Drug education given and explained to patient. Patient verbalized understanding.  Recommended follow-up for full physical examination and routine health maintanence.  Follow-up for routine health maintenance as directed   Foot care discussed. Pt may benefit from podiatry referral if sx persist or worsen

## 2021-08-26 ENCOUNTER — TELEPHONE (OUTPATIENT)
Dept: ORTHOPEDIC SURGERY | Facility: CLINIC | Age: 66
End: 2021-08-26

## 2021-08-26 DIAGNOSIS — Z96.641 HISTORY OF TOTAL RIGHT HIP REPLACEMENT: Primary | ICD-10-CM

## 2021-08-26 NOTE — TELEPHONE ENCOUNTER
Provider:  DR. RIDGE RIBEIRO  Caller: ASHWIN NIELSEN  Relationship to Patient:  SELF  Pharmacy: [facility] [phone]  Phone Number: [phone]  Reason for Call:  PATIENT HAD RIGHT SHANDA BY [facility] ON 8/21/19. PATIENT HAS A DENTAL APPT FOR A PROBLEM WITH A CROWN ON 8/30/21 AND IS CALLING ABOUT PREMEDICATION.

## 2021-08-27 RX ORDER — AMOXICILLIN 500 MG/1
CAPSULE ORAL
Qty: 4 CAPSULE | Refills: 1 | Status: SHIPPED | OUTPATIENT
Start: 2021-08-27 | End: 2021-12-21

## 2021-12-20 DIAGNOSIS — F41.9 ANXIETY: ICD-10-CM

## 2021-12-20 RX ORDER — ESCITALOPRAM OXALATE 10 MG/1
TABLET ORAL
Qty: 45 TABLET | Refills: 0 | OUTPATIENT
Start: 2021-12-20

## 2021-12-21 ENCOUNTER — TELEPHONE (OUTPATIENT)
Dept: FAMILY MEDICINE CLINIC | Facility: CLINIC | Age: 66
End: 2021-12-21

## 2021-12-21 DIAGNOSIS — F41.9 ANXIETY: ICD-10-CM

## 2021-12-21 RX ORDER — ESCITALOPRAM OXALATE 10 MG/1
TABLET ORAL
Qty: 45 TABLET | Refills: 0 | Status: SHIPPED | OUTPATIENT
Start: 2021-12-21 | End: 2022-01-20 | Stop reason: SDUPTHER

## 2021-12-21 NOTE — TELEPHONE ENCOUNTER
Patient called requesting a refill of Lexapro to be sent to [facility] [place] to last until appointment on 1/11/22

## 2021-12-21 NOTE — TELEPHONE ENCOUNTER
Called patient to inform her she needs a Physical for further refills. I was unable to reach her but I did leave a vm.

## 2022-01-08 ENCOUNTER — TELEPHONE (OUTPATIENT)
Dept: FAMILY MEDICINE CLINIC | Facility: CLINIC | Age: 67
End: 2022-01-08

## 2022-01-08 DIAGNOSIS — U07.1 COVID-19 VIRUS INFECTION: Primary | ICD-10-CM

## 2022-01-08 RX ORDER — PREDNISONE 10 MG/1
10 TABLET ORAL TAKE AS DIRECTED
Qty: 35 TABLET | Refills: 0 | Status: SHIPPED | OUTPATIENT
Start: 2022-01-08 | End: 2022-01-23

## 2022-01-08 RX ORDER — AZITHROMYCIN 250 MG/1
TABLET, FILM COATED ORAL
Qty: 6 TABLET | Refills: 0 | Status: SHIPPED | OUTPATIENT
Start: 2022-01-08

## 2022-01-08 NOTE — TELEPHONE ENCOUNTER
Pt calls to report upper respiratory infection,  cough nasal congestion and low grade fever, no SOA. She did a home covid test that is positive. She has a pulse oximeter and reports values of 9 /96. She reports hx of covid 11/2020.    She requests abx and steroids as she did last yr. I explained that should her sxs worsen we now have anti viral meds and antibody infusion should her sxs worsen.

## 2022-01-13 ENCOUNTER — TELEPHONE (OUTPATIENT)
Dept: FAMILY MEDICINE CLINIC | Facility: CLINIC | Age: 67
End: 2022-01-13

## 2022-01-13 NOTE — TELEPHONE ENCOUNTER
Called back-lmom we do not know what prescriptions are needed. They can call their pharmacy to check on how much they have left.

## 2022-01-13 NOTE — TELEPHONE ENCOUNTER
Caller: Savannah Bruner    Relationship: Self    Best call back number: [phone]    What is the best time to reach you: ANYTIME     Who are you requesting to speak with (clinical staff, provider,  specific staff member): CLINICAL STAFF     What was the call regarding:  PATIENT CALLED IN TODAY TO GET HER AND HER  RESCHEDULE FOR THE APPOINTMENTS THEY MISSED ON 1/11/22  DUE TO PATIENTS  HAVING COVID.   I RESCHEDULED THIS PATIENT FOR HER MEDICARE WELLNESS VISIT FOR 2\1\22 AND SHE IS REQUESTING THAT WE PROVIDE HER REFILLS FOR ANY PRESCRIPTIONS THE PATIENT IS DUE FOR, DUE TO MISSING THE APPOINTMENT ON THE 11TH.     PLEASE CALL AND ADVISE

## 2022-01-13 NOTE — TELEPHONE ENCOUNTER
Caller: Savannah Bruner    Relationship: Self    Best call back number: [phone]    What orders are you requesting (i.e. lab or imaging): FASTING LABS FOR [facility]     In what timeframe would the patient need to come in: 1 WEEK PRIOR TO MEDICARE WELLNESS VISIT ON 2/1/22       Where will you receive your lab/imaging services: IN OFFICE    Additional notes: ONCE ORDER IS ENTERED PLEASE CONTACT PATIENT FOR SCHEDULING NEEDS

## 2022-01-13 NOTE — TELEPHONE ENCOUNTER
Patient called in returning a call but I didn't see anything.  Told patient I would send a message and have them give her a call if they needed to. Patient would like to have a return call.

## 2022-01-13 NOTE — TELEPHONE ENCOUNTER
Called back-lmom Dr. Durán does not order labs prior to appointment. Pt can do labs after being seen in office.

## 2022-01-18 DIAGNOSIS — F41.9 ANXIETY: ICD-10-CM

## 2022-01-18 RX ORDER — ESCITALOPRAM OXALATE 10 MG/1
TABLET ORAL
Qty: 45 TABLET | Refills: 0 | OUTPATIENT
Start: 2022-01-18

## 2022-01-20 ENCOUNTER — TELEPHONE (OUTPATIENT)
Dept: FAMILY MEDICINE CLINIC | Facility: CLINIC | Age: 67
End: 2022-01-20

## 2022-01-20 DIAGNOSIS — F41.9 ANXIETY: ICD-10-CM

## 2022-01-20 RX ORDER — ESCITALOPRAM OXALATE 10 MG/1
TABLET ORAL
Qty: 45 TABLET | Refills: 0 | Status: SHIPPED | OUTPATIENT
Start: 2022-01-20 | End: 2022-02-17

## 2022-01-20 NOTE — TELEPHONE ENCOUNTER
Savannah called and needs enough of the escitalopram to get her to her next appt on 2-1-22. Please send to [facility] in [place] and contact her at . Thanks Jessa

## 2022-02-01 ENCOUNTER — OFFICE VISIT (OUTPATIENT)
Dept: FAMILY MEDICINE CLINIC | Facility: CLINIC | Age: 67
End: 2022-02-01

## 2022-02-01 VITALS
WEIGHT: 148.6 LBS | HEIGHT: 65 IN | TEMPERATURE: 97.3 F | BODY MASS INDEX: 24.76 KG/M2 | OXYGEN SATURATION: 98 % | HEART RATE: 98 BPM | SYSTOLIC BLOOD PRESSURE: 150 MMHG | RESPIRATION RATE: 18 BRPM | DIASTOLIC BLOOD PRESSURE: 82 MMHG

## 2022-02-01 DIAGNOSIS — I10 HYPERTENSION, BENIGN: Primary | ICD-10-CM

## 2022-02-01 DIAGNOSIS — E78.2 MIXED HYPERLIPIDEMIA: ICD-10-CM

## 2022-02-01 DIAGNOSIS — Z78.0 POSTMENOPAUSE: ICD-10-CM

## 2022-02-01 DIAGNOSIS — Z11.59 NEED FOR HEPATITIS C SCREENING TEST: ICD-10-CM

## 2022-02-01 DIAGNOSIS — Z12.31 ENCOUNTER FOR SCREENING MAMMOGRAM FOR MALIGNANT NEOPLASM OF BREAST: ICD-10-CM

## 2022-02-01 LAB
BILIRUB BLD-MCNC: NEGATIVE MG/DL
CLARITY, POC: CLEAR
COLOR UR: YELLOW
GLUCOSE UR STRIP-MCNC: NEGATIVE MG/DL
KETONES UR QL: NEGATIVE
LEUKOCYTE EST, POC: NEGATIVE
NITRITE UR-MCNC: NEGATIVE MG/ML
PH UR: 5 [PH] (ref 5–8)
PROT UR STRIP-MCNC: NEGATIVE MG/DL
RBC # UR STRIP: NEGATIVE /UL
SP GR UR: 1.01 (ref 1–1.03)
UROBILINOGEN UR QL: NORMAL

## 2022-02-01 PROCEDURE — 81002 URINALYSIS NONAUTO W/O SCOPE: CPT | Performed by: FAMILY MEDICINE

## 2022-02-01 PROCEDURE — 1126F AMNT PAIN NOTED NONE PRSNT: CPT | Performed by: FAMILY MEDICINE

## 2022-02-01 PROCEDURE — 1159F MED LIST DOCD IN RCRD: CPT | Performed by: FAMILY MEDICINE

## 2022-02-01 PROCEDURE — G0439 PPPS, SUBSEQ VISIT: HCPCS | Performed by: FAMILY MEDICINE

## 2022-02-01 PROCEDURE — 1170F FXNL STATUS ASSESSED: CPT | Performed by: FAMILY MEDICINE

## 2022-02-01 PROCEDURE — 99214 OFFICE O/P EST MOD 30 MIN: CPT | Performed by: FAMILY MEDICINE

## 2022-02-01 RX ORDER — LATANOPROST 50 UG/ML
SOLUTION/ DROPS OPHTHALMIC
COMMUNITY
Start: 2022-01-15

## 2022-02-01 NOTE — PROGRESS NOTES
Subjective   Savannah Bruner is a 66 y.o. female.   Chief Complaint   Patient presents with   • Medicare Wellness-subsequent       The patient is here: for coordination of medical care and annual wellness examination. Current pain scale 0/10.  BP elevated at dentist office, does not check at home   FH colon cancer      The following portions of the patient's history were reviewed and updated as appropriate: allergies, current medications, past family history, past medical history, past social history, past surgical history and problem list.    Patient Active Problem List   Diagnosis   • Polyarthropathy or polyarthritis   • Anxiety   • Chronic angle-closure glaucoma   • Depressive disorder   • Hyperlipidemia   • Hypertension, benign   • Primary osteoarthritis of right knee   • Overweight (BMI 25.0-29.9)   • History of total right hip replacement       Current Outpatient Medications on File Prior to Visit   Medication Sig Dispense Refill   • escitalopram (LEXAPRO) 10 MG tablet Take 1.5 tablet daily 45 tablet 0   • Ferrous Sulfate (IRON) 28 MG tablet Take  by mouth Daily.     • latanoprost (XALATAN) 0.005 % ophthalmic solution INSTILL 1 DROP INTO EACH EYE AT BEDTIME     • azithromycin (ZITHROMAX) 250 MG tablet Take 2 tablets the first day, then 1 tablet daily for 4 days. 6 tablet 0   • bimatoprost (LUMIGAN) 0.03 % ophthalmic drops Administer 1 drop to both eyes Daily.     • LUMIGAN 0.01 % ophthalmic drops INSTILL 1 DROP INTO EACH EYE daily  6     No current facility-administered medications on file prior to visit.     Current outpatient and discharge medications have been reconciled for the patient.  Reviewed by: Jay Durán MD      Allergies   Allergen Reactions   • Latex Itching     Hands itch not diagnosed       Review of Systems   Constitutional: Negative for activity change, appetite change, fatigue and fever.   HENT: Negative for ear pain, swollen glands and voice change.    Eyes: Negative for visual  "disturbance.   Respiratory: Negative for shortness of breath and wheezing.    Cardiovascular: Negative for chest pain and leg swelling.   Gastrointestinal: Negative for abdominal pain, blood in stool, constipation, diarrhea, nausea and vomiting.   Endocrine: Negative for polydipsia and polyuria.   Genitourinary: Negative for dysuria, frequency and hematuria.   Musculoskeletal: Negative for joint swelling, neck pain and neck stiffness.   Skin: Negative for rash and wound.   Neurological: Negative for weakness, numbness and headache.   Psychiatric/Behavioral: Negative for suicidal ideas and depressed mood.     I have reviewed and confirmed the accuracy of the ROS as documented by the MA/LPN/RN Jay Durán MD    Objective   Visit Vitals  /82 (BP Location: Right arm, Patient Position: Sitting, Cuff Size: Adult)   Pulse 98   Temp 97.3 °F (36.3 °C) (Temporal)   Resp 18   Ht 165.1 cm (65\")   Wt 67.4 kg (148 lb 9.6 oz)   SpO2 98%   BMI 24.73 kg/m²       Physical Exam  Constitutional:       Appearance: She is well-developed.   HENT:      Head: Normocephalic and atraumatic.      Right Ear: External ear normal.      Left Ear: External ear normal.      Nose: Nose normal.   Eyes:      Pupils: Pupils are equal, round, and reactive to light.   Cardiovascular:      Rate and Rhythm: Normal rate and regular rhythm.      Heart sounds: Normal heart sounds.   Pulmonary:      Effort: Pulmonary effort is normal.      Breath sounds: Normal breath sounds.   Abdominal:      General: Bowel sounds are normal.      Palpations: Abdomen is soft.   Musculoskeletal:         General: Normal range of motion.      Cervical back: Normal range of motion and neck supple.   Skin:     General: Skin is warm and dry.   Neurological:      Mental Status: She is alert and oriented to person, place, and time.   Psychiatric:         Behavior: Behavior normal.         Thought Content: Thought content normal.         Judgment: Judgment normal. "       Derm Physical Exam    Assessment/Plan .    Diagnoses and all orders for this visit:    1. Hypertension, benign (Primary)  Overview:  will follow. Previously normal    Orders:  -     POCT urinalysis dipstick, manual  -     CBC Auto Differential  -     Comprehensive Metabolic Panel    2. Need for hepatitis C screening test  -     Hepatitis C antibody    3. Mixed hyperlipidemia  Overview:  Recheck labs     Orders:  -     Comprehensive Metabolic Panel  -     Lipid Panel With / Chol / HDL Ratio  -     TSH    4. Postmenopause  -     DEXA Bone Density Axial    5. Encounter for screening mammogram for malignant neoplasm of breast  -     Mammo Screening Digital Tomosynthesis Bilateral With CAD   Discussed anticipatory guidance, diet, exercise, and weight loss.  Discussed safety and routine screening examinations.  Discussed self-examinations.  Patient's Body mass index is 24.73 kg/m². indicating that she is within normal range (BMI 18.5-24.9). No BMI management plan needed..   Follow up in 3 months for recheck, sooner for concerns.   [facility] follow up for BP check   Savannah Bruner  reports that she has never smoked. She has never used smokeless tobacco.  Follow-up for routine health maintenance as indicated.    I wore protective equipment throughout this patient encounter to include mask and eye protection. Hand hygiene was performed before donning protective equipment and after removal when leaving the room

## 2022-02-01 NOTE — PROGRESS NOTES
The ABCs of the Annual Wellness Visit  Subsequent Medicare Wellness Visit    Chief Complaint   Patient presents with   • Medicare Wellness-subsequent      Subjective    History of Present Illness:  Savannah Bruner is a 66 y.o. female who presents for a Subsequent Medicare Wellness Visit.    The following portions of the patient's history were reviewed and   updated as appropriate: allergies, current medications, past family history, past medical history, past social history, past surgical history and problem list.    Compared to one year ago, the patient feels her physical   health is the same.    Compared to one year ago, the patient feels her mental   health is the same.    Recent Hospitalizations:  She was not admitted to the hospital during the last year.       Current Medical Providers:  Patient Care Team:  Jay Durán MD as PCP - General (Family Medicine)    Outpatient Medications Prior to Visit   Medication Sig Dispense Refill   • escitalopram (LEXAPRO) 10 MG tablet Take 1.5 tablet daily 45 tablet 0   • Ferrous Sulfate (IRON) 28 MG tablet Take  by mouth Daily.     • latanoprost (XALATAN) 0.005 % ophthalmic solution INSTILL 1 DROP INTO EACH EYE AT BEDTIME     • azithromycin (ZITHROMAX) 250 MG tablet Take 2 tablets the first day, then 1 tablet daily for 4 days. 6 tablet 0   • bimatoprost (LUMIGAN) 0.03 % ophthalmic drops Administer 1 drop to both eyes Daily.     • LUMIGAN 0.01 % ophthalmic drops INSTILL 1 DROP INTO EACH EYE daily  6     No facility-administered medications prior to visit.       No opioid medication identified on active medication list. I have reviewed chart for other potential  high risk medication/s and harmful drug interactions in the elderly.          Aspirin is not on active medication list.  Aspirin use is not indicated based on review of current medical condition/s. Risk of harm outweighs potential benefits.  .    Patient Active Problem List   Diagnosis   • Polyarthropathy or  "polyarthritis   • Anxiety   • Chronic angle-closure glaucoma   • Depressive disorder   • Hyperlipidemia   • Hypertension, benign   • Primary osteoarthritis of right knee   • Overweight (BMI 25.0-29.9)   • History of total right hip replacement     Advance Care Planning  Advance Directive is not on file.  ACP discussion was declined by the patient. Patient has an advance directive (not in EMR), copy requested.          Objective    Vitals:    02/01/22 1018   BP: 150/82   BP Location: Right arm   Patient Position: Sitting   Cuff Size: Adult   Pulse: 98   Resp: 18   Temp: 97.3 °F (36.3 °C)   TempSrc: Temporal   SpO2: 98%   Weight: 67.4 kg (148 lb 9.6 oz)   Height: 165.1 cm (65\")     BMI Readings from Last 1 Encounters:   02/01/22 24.73 kg/m²   BMI is within normal parameters. No follow-up required.  Body mass index is 24.73 kg/m².  BMI has not been calculated during today's encounter.     Does the patient have evidence of cognitive impairment? No            HEALTH RISK ASSESSMENT    Smoking Status:  Social History     Tobacco Use   Smoking Status Never Smoker   Smokeless Tobacco Never Used     Alcohol Consumption:  Social History     Substance and Sexual Activity   Alcohol Use No     Fall Risk Screen:    STEADI Fall Risk Assessment has not been completed.    Depression Screening:  PHQ-2/PHQ-9 Depression Screening 11/23/2020   Little interest or pleasure in doing things 0   Feeling down, depressed, or hopeless 0   Total Score 0       Health Habits and Functional and Cognitive Screening:  Functional & Cognitive Status 2/1/2022   Current Diet Well Balanced Diet   Dental Exam Up to date   Eye Exam Up to date   Exercise (times per week) 0 times per week   Current Exercises Include No Regular Exercise       Age-appropriate Screening Schedule:  Refer to the list below for future screening recommendations based on patient's age, sex and/or medical conditions. Orders for these recommended tests are listed in the plan section. " The patient has been provided with a written plan.    Health Maintenance   Topic Date Due   • MAMMOGRAM  Never done   • DXA SCAN  Never done   • TDAP/TD VACCINES (1 - Tdap) Never done   • ZOSTER VACCINE (1 of 2) Never done   • LIPID PANEL  Never done   • INFLUENZA VACCINE  08/01/2021              Assessment/Plan   CMS Preventative Services Quick Reference  Risk Factors Identified During Encounter  None Identified  The above risks/problems have been discussed with the patient.  Follow up actions/plans if indicated are seen below in the Assessment/Plan Section.  Pertinent information has been shared with the patient in the After Visit Summary.    Follow Up:   Return in about 6 months (around 8/1/2022).     An After Visit Summary and PPPS were made available to the patient.

## 2022-02-01 NOTE — PATIENT INSTRUCTIONS
Medicare Wellness  Personal Prevention Plan of Service     Date of Office Visit:  2022  Encounter Provider:  Jay Durán MD  Place of Service:  [facility]  Patient Name: Savannah Bruner  :  1955    As part of the Medicare Wellness portion of your visit today, we are providing you with this personalized preventive plan of services (PPPS). This plan is based upon recommendations of the [place] Preventive Services Task Force (USPSTF) and the Advisory Committee on Immunization Practices (ACIP).    This lists the preventive care services that should be considered, and provides dates of when you are due. Items listed as completed are up-to-date and do not require any further intervention.    Health Maintenance   Topic Date Due   • MAMMOGRAM  Never done   • DXA SCAN  Never done   • COLORECTAL CANCER SCREENING  Never done   • COVID-19 Vaccine (1) Never done   • TDAP/TD VACCINES (1 - Tdap) Never done   • ZOSTER VACCINE (1 of 2) Never done   • HEPATITIS C SCREENING  Never done   • ANNUAL WELLNESS VISIT  Never done   • LIPID PANEL  Never done   • Pneumococcal Vaccine 65+ (1 of 1 - PPSV23) Never done   • INFLUENZA VACCINE  2021       Orders Placed This Encounter   Procedures   • POCT urinalysis dipstick, manual     Order Specific Question:   Release to patient     Answer:   Immediate       Return in about 6 months (around 2022).          Fall Prevention in the Home, Adult  Falls can cause injuries. They can happen to people of all ages. There are many things you can do to make your home safe and to help prevent falls. Ask for help when making these changes, if needed.  What actions can I take to prevent falls?  General Instructions  · Use good lighting in all rooms. Replace any light bulbs that burn out.  · Turn on the lights when you go into a dark area. Use night-lights.  · Keep items that you use often in easy-to-reach places. Lower the shelves around  your home if necessary.  · Set up your furniture so you have a clear path. Avoid moving your furniture around.  · Do not have throw rugs and other things on the floor that can make you trip.  · Avoid walking on wet floors.  · If any of your floors are uneven, fix them.  · Add color or contrast paint or tape to clearly rima and help you see:  ? Any grab bars or handrails.  ? First and last steps of stairways.  ? Where the edge of each step is.  · If you use a stepladder:  ? Make sure that it is fully opened. Do not climb a closed stepladder.  ? Make sure that both sides of the stepladder are locked into place.  ? Ask someone to hold the stepladder for you while you use it.  · If there are any pets around you, be aware of where they are.  What can I do in the bathroom?         · Keep the floor dry. Clean up any water that spills onto the floor as soon as it happens.  · Remove soap buildup in the tub or shower regularly.  · Use non-skid mats or decals on the floor of the tub or shower.  · Attach bath mats securely with double-sided, non-slip rug tape.  · If you need to sit down in the shower, use a plastic, non-slip stool.  · Install grab bars by the toilet and in the tub and shower. Do not use towel bars as grab bars.  What can I do in the bedroom?  · Make sure that you have a light by your bed that is easy to reach.  · Do not use any sheets or blankets that are too big for your bed. They should not hang down onto the floor.  · Have a firm chair that has side arms. You can use this for support while you get dressed.  What can I do in the kitchen?  · Clean up any spills right away.  · If you need to reach something above you, use a strong step stool that has a grab bar.  · Keep electrical cords out of the way.  · Do not use floor polish or wax that makes floors slippery. If you must use wax, use non-skid floor wax.  What can I do with my stairs?  · Do not leave any items on the stairs.  · Make sure that you have a  light switch at the top of the stairs and the bottom of the stairs. If you do not have them, ask someone to add them for you.  · Make sure that there are handrails on both sides of the stairs, and use them. Fix handrails that are broken or loose. Make sure that handrails are as long as the stairways.  · Install non-slip stair treads on all stairs in your home.  · Avoid having throw rugs at the top or bottom of the stairs. If you do have throw rugs, attach them to the floor with carpet tape.  · Choose a carpet that does not hide the edge of the steps on the stairway.  · Check any carpeting to make sure that it is firmly attached to the stairs. Fix any carpet that is loose or worn.  What can I do on the outside of my home?  · Use bright outdoor lighting.  · Regularly fix the edges of walkways and driveways and fix any cracks.  · Remove anything that might make you trip as you walk through a door, such as a raised step or threshold.  · Trim any bushes or trees on the path to your home.  · Regularly check to see if handrails are loose or broken. Make sure that both sides of any steps have handrails.  · Install guardrails along the edges of any raised decks and porches.  · Clear walking paths of anything that might make someone trip, such as tools or rocks.  · Have any leaves, snow, or ice cleared regularly.  · Use sand or salt on walking paths during winter.  · Clean up any spills in your garage right away. This includes grease or oil spills.  What other actions can I take?  · Wear shoes that:  ? Have a low heel. Do not wear high heels.  ? Have rubber bottoms.  ? Are comfortable and fit you well.  ? Are closed at the toe. Do not wear open-toe sandals.  · Use tools that help you move around (mobility aids) if they are needed. These include:  ? Canes.  ? Walkers.  ? Scooters.  ? Crutches.  · Review your medicines with your doctor. Some medicines can make you feel dizzy. This can increase your chance of falling.  Ask your  doctor what other things you can do to help prevent falls.  Where to find more information  · Centers for Disease Control and Prevention, STEADI: https://cdc.gov  · National [place] on Aging: https://hd0fyfd.pia.nih.gov  Contact a doctor if:  · You are afraid of falling at home.  · You feel weak, drowsy, or dizzy at home.  · You fall at home.  Summary  · There are many simple things that you can do to make your home safe and to help prevent falls.  · Ways to make your home safe include removing tripping hazards and installing grab bars in the bathroom.  · Ask for help when making these changes in your home.  This information is not intended to replace advice given to you by your health care provider. Make sure you discuss any questions you have with your health care provider.  Document Revised: 04/09/2020 Document Reviewed: 08/02/2018  [employer] Patient Education © 2021 [employer]      Sit-to-Stand Exercise    The sit-to-stand exercise (also known as the chair stand or chair rise exercise) strengthens your lower body and helps you maintain or improve your mobility and independence. The goal is to do the sit-to-stand exercise without using your hands. This will be easier as you become stronger. You should always talk with your health care provider before starting any exercise program, especially if you have had recent surgery.  Do the exercise exactly as told by your health care provider and adjust it as directed. It is normal to feel mild stretching, pulling, tightness, or discomfort as you do this exercise, but you should stop right away if you feel sudden pain or your pain gets worse. Do not begin doing this exercise until told by your health care provider.  What the sit-to-stand exercise does  The sit-to-stand exercise helps to strengthen the muscles in your thighs and the muscles in the center of your body that give you stability (core muscles). This exercise is especially helpful if:  · You have had knee or  hip surgery.  · You have trouble getting up from a chair, out of a car, or off the toilet.  How to do the sit-to-stand exercise  1. Sit toward the front edge of a sturdy chair without armrests. Your knees should be bent and your feet should be flat on the floor and shoulder-width apart.  2. Place your hands lightly on each side of the seat. Keep your back and neck as straight as possible, with your chest slightly forward.  3. Breathe in slowly. Lean forward and slightly shift your weight to the front of your feet.  4. Breathe out as you slowly stand up. Use your hands as little as possible.  5. Stand and pause for a full breath in and out.  6. Breathe in as you sit down slowly. Tighten your core and abdominal muscles to control your lowering as much as possible.  7. Breathe out slowly.  8. Do this exercise 10-15 times. If needed, do it fewer times until you build up strength.  9. Rest for 1 minute, then do another set of 10-15 repetitions.  To change the difficulty of the sit-to-stand exercise  · If the exercise is too difficult, use a chair with sturdy armrests, and push off the armrests to help you come to the standing position. You can also use the armrests to help slowly lower yourself back to sitting. As this gets easier, try to use your arms less. You can also place a firm cushion or pillow on the chair to make the surface higher.  · If this exercise is too easy, do not use your arms to help raise or lower yourself. You can also wear a weighted vest, use hand weights, increase your repetitions, or try a lower chair.  General tips  · You may feel tired when starting an exercise routine. This is normal.  · You may have muscle soreness that lasts a few days. This is normal. As you get stronger, you may not feel muscle soreness.  · Use smooth, steady movements.  · Do not  hold your breath during strength exercises. This can cause unsafe changes in your blood pressure.  · Breathe in slowly through your nose, and  breathe out slowly through your mouth.  Summary  · Strengthening your lower body is an important step to help you move safely and independently.  · The sit-to-stand exercise helps strengthen the muscles in your thighs and core.  · You should always talk with your health care provider before starting any exercise program, especially if you have had recent surgery.  This information is not intended to replace advice given to you by your health care provider. Make sure you discuss any questions you have with your health care provider.  Document Revised: 10/16/2019 Document Reviewed: 02/08/2018  [employer] Patient Education © 2021 [employer]      Advance Directive    Advance directives are legal documents that let you make choices ahead of time about your health care and medical treatment in case you become unable to communicate for yourself. Advance directives are a way for you to make known your wishes to family, friends, and health care providers. This can let others know about your end-of-life care if you become unable to communicate.  Discussing and writing advance directives should happen over time rather than all at once. Advance directives can be changed depending on your situation and what you want, even after you have signed the advance directives.  There are different types of advance directives, such as:  · Medical power of .  · Living will.  · Do not resuscitate (DNR) or do not attempt resuscitation (DNAR) order.  Health care proxy and medical power of   A health care proxy is also called a health care agent. This is a person who is appointed to make medical decisions for you in cases where you are unable to make the decisions yourself. Generally, people choose someone they know well and trust to represent their preferences. Make sure to ask this person for an agreement to act as your proxy. A proxy may have to exercise judgment in the event of a medical decision for which your wishes are  not known.  A medical power of  is a legal document that names your health care proxy. Depending on the laws in your state, after the document is written, it may also need to be:  · Signed.  · Notarized.  · Dated.  · Copied.  · Witnessed.  · Incorporated into your medical record.  You may also want to appoint someone to manage your money in a situation in which you are unable to do so. This is called a durable power of  for finances. It is a separate legal document from the durable power of  for health care. You may choose the same person or someone different from your health care proxy to act as your agent in money matters.  If you do not appoint a proxy, or if there is a concern that the proxy is not acting in your best interests, a court may appoint a guardian to act on your behalf.  Living will  A living will is a set of instructions that state your wishes about medical care when you cannot express them yourself. Health care providers should keep a copy of your living will in your medical record. You may want to give a copy to family members or friends. To alert caregivers in case of an emergency, you can place a card in your wallet to let them know that you have a living will and where they can find it. A living will is used if you become:  · Terminally ill.  · Disabled.  · Unable to communicate or make decisions.  Items to consider in your living will include:  · To use or not to use life-support equipment, such as dialysis machines and breathing machines (ventilators).  · A DNR or DNAR order. This tells health care providers not to use cardiopulmonary resuscitation (CPR) if breathing or heartbeat stops.  · To use or not to use tube feeding.  · To be given or not to be given food and fluids.  · Comfort (palliative) care when the goal becomes comfort rather than a cure.  · Donation of organs and tissues.  A living will does not give instructions for distributing your money and property  if you should pass away.  DNR or DNAR  A DNR or DNAR order is a request not to have CPR in the event that your heart stops beating or you stop breathing. If a DNR or DNAR order has not been made and shared, a health care provider will try to help any patient whose heart has stopped or who has stopped breathing. If you plan to have surgery, talk with your health care provider about how your DNR or DNAR order will be followed if problems occur.  What if I do not have an advance directive?  If you do not have an advance directive, some states assign family decision makers to act on your behalf based on how closely you are related to them. Each state has its own laws about advance directives. You may want to check with your health care provider, , or state representative about the laws in your state.  Summary  · Advance directives are the legal documents that allow you to make choices ahead of time about your health care and medical treatment in case you become unable to tell others about your care.  · The process of discussing and writing advance directives should happen over time. You can change the advance directives, even after you have signed them.  · Advance directives include DNR or DNAR orders, living buchanan, and designating an agent as your medical power of .  This information is not intended to replace advice given to you by your health care provider. Make sure you discuss any questions you have with your health care provider.  Document Revised: 01/28/2021 Document Reviewed: 07/16/2020  [employer] Patient Education © 2021 [employer]

## 2022-02-02 LAB
ALBUMIN SERPL-MCNC: 4.3 G/DL (ref 3.8–4.8)
ALBUMIN/GLOB SERPL: 1.7 {RATIO} (ref 1.2–2.2)
ALP SERPL-CCNC: 62 IU/L (ref 44–121)
ALT SERPL-CCNC: 15 IU/L (ref 0–32)
AST SERPL-CCNC: 20 IU/L (ref 0–40)
BASOPHILS # BLD AUTO: 0 X10E3/UL (ref 0–0.2)
BASOPHILS NFR BLD AUTO: 0 %
BILIRUB SERPL-MCNC: 0.5 MG/DL (ref 0–1.2)
BUN SERPL-MCNC: 11 MG/DL (ref 8–27)
BUN/CREAT SERPL: 11 (ref 12–28)
CALCIUM SERPL-MCNC: 10.1 MG/DL (ref 8.7–10.3)
CHLORIDE SERPL-SCNC: 105 MMOL/L (ref 96–106)
CHOLEST SERPL-MCNC: 240 MG/DL (ref 100–199)
CHOLEST/HDLC SERPL: 3.1 RATIO (ref 0–4.4)
CO2 SERPL-SCNC: 25 MMOL/L (ref 20–29)
CREAT SERPL-MCNC: 0.96 MG/DL (ref 0.57–1)
EOSINOPHIL # BLD AUTO: 0.1 X10E3/UL (ref 0–0.4)
EOSINOPHIL NFR BLD AUTO: 2 %
ERYTHROCYTE [DISTWIDTH] IN BLOOD BY AUTOMATED COUNT: 12.4 % (ref 11.7–15.4)
GLOBULIN SER CALC-MCNC: 2.6 G/DL (ref 1.5–4.5)
GLUCOSE SERPL-MCNC: 94 MG/DL (ref 65–99)
HCT VFR BLD AUTO: 36.6 % (ref 34–46.6)
HCV AB S/CO SERPL IA: 0.1 S/CO RATIO (ref 0–0.9)
HDLC SERPL-MCNC: 77 MG/DL
HGB BLD-MCNC: 12.2 G/DL (ref 11.1–15.9)
IMM GRANULOCYTES # BLD AUTO: 0 X10E3/UL (ref 0–0.1)
IMM GRANULOCYTES NFR BLD AUTO: 0 %
LDLC SERPL CALC-MCNC: 140 MG/DL (ref 0–99)
LYMPHOCYTES # BLD AUTO: 1 X10E3/UL (ref 0.7–3.1)
LYMPHOCYTES NFR BLD AUTO: 22 %
MCH RBC QN AUTO: 30 PG (ref 26.6–33)
MCHC RBC AUTO-ENTMCNC: 33.3 G/DL (ref 31.5–35.7)
MCV RBC AUTO: 90 FL (ref 79–97)
MONOCYTES # BLD AUTO: 0.3 X10E3/UL (ref 0.1–0.9)
MONOCYTES NFR BLD AUTO: 7 %
NEUTROPHILS # BLD AUTO: 3.2 X10E3/UL (ref 1.4–7)
NEUTROPHILS NFR BLD AUTO: 69 %
PLATELET # BLD AUTO: 219 X10E3/UL (ref 150–450)
POTASSIUM SERPL-SCNC: 5 MMOL/L (ref 3.5–5.2)
PROT SERPL-MCNC: 6.9 G/DL (ref 6–8.5)
RBC # BLD AUTO: 4.06 X10E6/UL (ref 3.77–5.28)
SODIUM SERPL-SCNC: 145 MMOL/L (ref 134–144)
TRIGL SERPL-MCNC: 133 MG/DL (ref 0–149)
TSH SERPL DL<=0.005 MIU/L-ACNC: 1.81 UIU/ML (ref 0.45–4.5)
VLDLC SERPL CALC-MCNC: 23 MG/DL (ref 5–40)
WBC # BLD AUTO: 4.6 X10E3/UL (ref 3.4–10.8)

## 2022-02-17 DIAGNOSIS — F41.9 ANXIETY: ICD-10-CM

## 2022-02-17 RX ORDER — ESCITALOPRAM OXALATE 10 MG/1
TABLET ORAL
Qty: 45 TABLET | Refills: 0 | Status: SHIPPED | OUTPATIENT
Start: 2022-02-17 | End: 2022-03-24

## 2022-02-28 ENCOUNTER — APPOINTMENT (OUTPATIENT)
Dept: MAMMOGRAPHY | Facility: HOSPITAL | Age: 67
End: 2022-02-28

## 2022-02-28 ENCOUNTER — APPOINTMENT (OUTPATIENT)
Dept: BONE DENSITY | Facility: HOSPITAL | Age: 67
End: 2022-02-28

## 2022-03-22 DIAGNOSIS — F41.9 ANXIETY: ICD-10-CM

## 2022-03-24 RX ORDER — ESCITALOPRAM OXALATE 10 MG/1
TABLET ORAL
Qty: 45 TABLET | Refills: 5 | Status: SHIPPED | OUTPATIENT
Start: 2022-03-24 | End: 2022-09-27

## 2022-03-30 ENCOUNTER — APPOINTMENT (OUTPATIENT)
Dept: BONE DENSITY | Facility: HOSPITAL | Age: 67
End: 2022-03-30

## 2022-03-30 ENCOUNTER — APPOINTMENT (OUTPATIENT)
Dept: MAMMOGRAPHY | Facility: HOSPITAL | Age: 67
End: 2022-03-30

## 2022-04-27 ENCOUNTER — APPOINTMENT (OUTPATIENT)
Dept: BONE DENSITY | Facility: HOSPITAL | Age: 67
End: 2022-04-27

## 2022-04-27 ENCOUNTER — APPOINTMENT (OUTPATIENT)
Dept: MAMMOGRAPHY | Facility: HOSPITAL | Age: 67
End: 2022-04-27

## 2022-06-15 ENCOUNTER — APPOINTMENT (OUTPATIENT)
Dept: BONE DENSITY | Facility: HOSPITAL | Age: 67
End: 2022-06-15

## 2022-06-15 ENCOUNTER — APPOINTMENT (OUTPATIENT)
Dept: MAMMOGRAPHY | Facility: HOSPITAL | Age: 67
End: 2022-06-15

## 2022-07-27 ENCOUNTER — APPOINTMENT (OUTPATIENT)
Dept: MAMMOGRAPHY | Facility: HOSPITAL | Age: 67
End: 2022-07-27

## 2022-07-27 ENCOUNTER — APPOINTMENT (OUTPATIENT)
Dept: BONE DENSITY | Facility: HOSPITAL | Age: 67
End: 2022-07-27

## 2022-08-31 ENCOUNTER — APPOINTMENT (OUTPATIENT)
Dept: MAMMOGRAPHY | Facility: HOSPITAL | Age: 67
End: 2022-08-31

## 2022-09-27 DIAGNOSIS — F41.9 ANXIETY: ICD-10-CM

## 2022-09-27 RX ORDER — ESCITALOPRAM OXALATE 10 MG/1
TABLET ORAL
Qty: 45 TABLET | Refills: 0 | Status: SHIPPED | OUTPATIENT
Start: 2022-09-27 | End: 2022-10-25

## 2022-10-12 ENCOUNTER — APPOINTMENT (OUTPATIENT)
Dept: MAMMOGRAPHY | Facility: HOSPITAL | Age: 67
End: 2022-10-12

## 2022-10-25 DIAGNOSIS — F41.9 ANXIETY: ICD-10-CM

## 2022-10-25 RX ORDER — ESCITALOPRAM OXALATE 10 MG/1
TABLET ORAL
Qty: 45 TABLET | Refills: 0 | Status: SHIPPED | OUTPATIENT
Start: 2022-10-25 | End: 2022-11-28

## 2022-11-15 ENCOUNTER — APPOINTMENT (OUTPATIENT)
Dept: MAMMOGRAPHY | Facility: HOSPITAL | Age: 67
End: 2022-11-15

## 2022-11-26 DIAGNOSIS — F41.9 ANXIETY: ICD-10-CM

## 2022-11-28 RX ORDER — ESCITALOPRAM OXALATE 10 MG/1
TABLET ORAL
Qty: 45 TABLET | Refills: 0 | Status: SHIPPED | OUTPATIENT
Start: 2022-11-28 | End: 2022-12-29

## 2022-12-28 DIAGNOSIS — F41.9 ANXIETY: ICD-10-CM

## 2022-12-29 RX ORDER — ESCITALOPRAM OXALATE 10 MG/1
TABLET ORAL
Qty: 45 TABLET | Refills: 0 | Status: SHIPPED | OUTPATIENT
Start: 2022-12-29 | End: 2023-01-31

## 2023-01-10 ENCOUNTER — APPOINTMENT (OUTPATIENT)
Dept: MAMMOGRAPHY | Facility: HOSPITAL | Age: 68
End: 2023-01-10
Payer: MEDICARE

## 2023-01-31 DIAGNOSIS — F41.9 ANXIETY: ICD-10-CM

## 2023-01-31 RX ORDER — ESCITALOPRAM OXALATE 10 MG/1
TABLET ORAL
Qty: 45 TABLET | Refills: 0 | Status: SHIPPED | OUTPATIENT
Start: 2023-01-31 | End: 2023-02-28

## 2023-02-28 DIAGNOSIS — F41.9 ANXIETY: ICD-10-CM

## 2023-02-28 RX ORDER — ESCITALOPRAM OXALATE 10 MG/1
TABLET ORAL
Qty: 45 TABLET | Refills: 0 | Status: SHIPPED | OUTPATIENT
Start: 2023-02-28 | End: 2023-04-06 | Stop reason: SDUPTHER

## 2023-04-04 DIAGNOSIS — F41.9 ANXIETY: ICD-10-CM

## 2023-04-04 RX ORDER — ESCITALOPRAM OXALATE 10 MG/1
TABLET ORAL
Qty: 45 TABLET | Refills: 0 | OUTPATIENT
Start: 2023-04-04

## 2023-04-06 DIAGNOSIS — F41.9 ANXIETY: ICD-10-CM

## 2023-04-06 RX ORDER — ESCITALOPRAM OXALATE 10 MG/1
15 TABLET ORAL DAILY
Qty: 45 TABLET | Refills: 3 | Status: SHIPPED | OUTPATIENT
Start: 2023-04-06

## 2023-04-06 NOTE — TELEPHONE ENCOUNTER
Caller: ISAIAS NIELSEN    Relationship: Emergency Contact    Best call back number: [phone]    Requested Prescriptions:   Requested Prescriptions     Pending Prescriptions Disp Refills   • escitalopram (LEXAPRO) 10 MG tablet 45 tablet 0     Sig: Take 1.5 tablets by mouth Daily.        Pharmacy where request should be sent: [facility] [address] [place] [address] [id number] - [phone]  - [phone]     Last office visit with prescribing clinician: 2/1/2022   Last telemedicine visit with prescribing clinician: Visit date not found   Next office visit with prescribing clinician: Visit date not found     Additional details provided by patient:    Does the patient have less than a 3 day supply:  [x] Yes  [] No    Would you like a call back once the refill request has been completed: [x] Yes [] No    If the office needs to give you a call back, can they leave a voicemail: [x] Yes [] No    April Oscar Trinidad Rep   04/06/23 10:36 EDT

## 2023-07-25 DIAGNOSIS — F41.9 ANXIETY: ICD-10-CM

## 2023-07-25 RX ORDER — ESCITALOPRAM OXALATE 10 MG/1
TABLET ORAL
Qty: 45 TABLET | Refills: 0 | Status: SHIPPED | OUTPATIENT
Start: 2023-07-25

## 2023-09-05 DIAGNOSIS — F41.9 ANXIETY: ICD-10-CM

## 2023-09-05 RX ORDER — ESCITALOPRAM OXALATE 10 MG/1
TABLET ORAL
Qty: 45 TABLET | Refills: 0 | Status: SHIPPED | OUTPATIENT
Start: 2023-09-05

## 2023-09-28 DIAGNOSIS — F41.9 ANXIETY: ICD-10-CM

## 2023-09-29 RX ORDER — ESCITALOPRAM OXALATE 10 MG/1
TABLET ORAL
Qty: 45 TABLET | Refills: 0 | Status: SHIPPED | OUTPATIENT
Start: 2023-09-29

## 2023-10-27 DIAGNOSIS — F41.9 ANXIETY: ICD-10-CM

## 2023-10-27 RX ORDER — ESCITALOPRAM OXALATE 10 MG/1
TABLET ORAL
Qty: 45 TABLET | Refills: 0 | OUTPATIENT
Start: 2023-10-27

## 2023-11-13 DIAGNOSIS — F41.9 ANXIETY: ICD-10-CM

## 2023-11-13 RX ORDER — ESCITALOPRAM OXALATE 10 MG/1
TABLET ORAL
Qty: 45 TABLET | Refills: 0 | OUTPATIENT
Start: 2023-11-13

## 2023-11-14 DIAGNOSIS — F41.9 ANXIETY: ICD-10-CM

## 2023-11-14 RX ORDER — ESCITALOPRAM OXALATE 10 MG/1
TABLET ORAL
Qty: 45 TABLET | Refills: 0 | Status: SHIPPED | OUTPATIENT
Start: 2023-11-14

## 2023-11-14 NOTE — TELEPHONE ENCOUNTER
Caller: ISAIAS NIELSEN    Relationship: Emergency Contact    Best call back number:     ISAIAS NIELSEN () [phone] (Mobile)       What was the call regarding: APPT HAS BEEN MADE FOR MONDAY     CAN YOU SEND AN EMERGENCY SUPPLY TO PHARMACY PLEASE     ONE DAY LEFT OF MEDS      Is it okay if the provider responds through MyChart:

## 2023-11-29 ENCOUNTER — TELEPHONE (OUTPATIENT)
Dept: FAMILY MEDICINE CLINIC | Facility: CLINIC | Age: 68
End: 2023-11-29
Payer: MEDICARE

## 2023-11-29 NOTE — TELEPHONE ENCOUNTER
Called and spoke to karen and celsa, I let them know  is not in today, so on call is advising she be seen.  celsa is in bed not feeling well and does no want to come in. I did offer an appointment today or tomorrow.   They asked I send this to  for him to advise in the morning when he is in office.   He would like medication sent in without an appointment since she doesn't feel good enough to be able to come in.

## 2023-11-29 NOTE — TELEPHONE ENCOUNTER
Looks like patient canceled last appointments on November 20, 2023 and January 10, 2023  Does not appear she has been seen since February 2022.  Patient really ought to be seen to evaluate new concern of nausea and diarrhea before treatment recommendations are given.  Please offer appointment or refer to urgent care.  Alert symptoms that would necessitate more urgent evaluation would include high fever, severe weakness or fainting, increased abdominal pain, blood in stool or emesis.  She needs to maintain hydration by drinking small amounts of clear fluids frequently.  If there is no chance of C. difficile or other bacterial causes of diarrhea would typically recommend Imodium for diarrhea but this can cause more severe infection.

## 2023-11-29 NOTE — TELEPHONE ENCOUNTER
Caller: ISAIAS NIELSEN    Relationship: Emergency Contact    Best call back number: [phone]     What medication are you requesting: SOMETHING FOR NAUSEA    What are your current symptoms: NAUSEOUS, DIARRHEA    How long have you been experiencing symptoms: 11.27.23    Have you had these symptoms before:    [] Yes  [x] No    Have you been treated for these symptoms before:   [] Yes  [x] No    If a prescription is needed, what is your preferred pharmacy and phone number: [facility] [address] [place], [place] [address] 135 [facility] [phone] [facility] [phone]      Additional notes: PATIENT IS REQUESTING A PRESCRIPTION FOR NAUSEA. ALSO, PATIENT WOULD LIKE TO BE ADVISED ON OVER THE COUNTER MEDICATIONS SHE CAN TAKE FOR DIARRHEA. PLEASE CALL PATIENT'S  (ON [facility] VERBAL)

## 2023-11-30 DIAGNOSIS — R19.7 DIARRHEA, UNSPECIFIED TYPE: ICD-10-CM

## 2023-11-30 DIAGNOSIS — R11.0 NAUSEA: Primary | ICD-10-CM

## 2023-11-30 RX ORDER — ONDANSETRON 4 MG/1
4 TABLET, FILM COATED ORAL EVERY 8 HOURS PRN
Qty: 20 TABLET | Refills: 0 | Status: SHIPPED | OUTPATIENT
Start: 2023-11-30

## 2023-11-30 RX ORDER — DIPHENOXYLATE HYDROCHLORIDE AND ATROPINE SULFATE 2.5; .025 MG/1; MG/1
1 TABLET ORAL 3 TIMES DAILY PRN
Qty: 12 TABLET | Refills: 0 | Status: SHIPPED | OUTPATIENT
Start: 2023-11-30

## 2023-12-21 ENCOUNTER — OFFICE VISIT (OUTPATIENT)
Dept: FAMILY MEDICINE CLINIC | Facility: CLINIC | Age: 68
End: 2023-12-21
Payer: MEDICARE

## 2023-12-21 VITALS
OXYGEN SATURATION: 95 % | RESPIRATION RATE: 20 BRPM | BODY MASS INDEX: 23.99 KG/M2 | HEART RATE: 85 BPM | WEIGHT: 144 LBS | SYSTOLIC BLOOD PRESSURE: 124 MMHG | HEIGHT: 65 IN | DIASTOLIC BLOOD PRESSURE: 80 MMHG

## 2023-12-21 DIAGNOSIS — F41.9 ANXIETY: ICD-10-CM

## 2023-12-21 DIAGNOSIS — Z00.00 MEDICARE ANNUAL WELLNESS VISIT, SUBSEQUENT: Primary | ICD-10-CM

## 2023-12-21 DIAGNOSIS — Z23 NEED FOR INFLUENZA VACCINATION: ICD-10-CM

## 2023-12-21 DIAGNOSIS — I10 HYPERTENSION, BENIGN: ICD-10-CM

## 2023-12-21 DIAGNOSIS — E78.2 MIXED HYPERLIPIDEMIA: ICD-10-CM

## 2023-12-21 LAB
BILIRUB BLD-MCNC: NEGATIVE MG/DL
CLARITY, POC: CLEAR
COLOR UR: YELLOW
GLUCOSE UR STRIP-MCNC: NEGATIVE MG/DL
KETONES UR QL: NEGATIVE
LEUKOCYTE EST, POC: NEGATIVE
NITRITE UR-MCNC: NEGATIVE MG/ML
PH UR: 6.5 [PH] (ref 5–8)
PROT UR STRIP-MCNC: NEGATIVE MG/DL
RBC # UR STRIP: ABNORMAL /UL
SP GR UR: 1.01 (ref 1–1.03)
UROBILINOGEN UR QL: ABNORMAL

## 2023-12-21 PROCEDURE — G0439 PPPS, SUBSEQ VISIT: HCPCS | Performed by: FAMILY MEDICINE

## 2023-12-21 PROCEDURE — 81002 URINALYSIS NONAUTO W/O SCOPE: CPT | Performed by: FAMILY MEDICINE

## 2023-12-21 PROCEDURE — 3079F DIAST BP 80-89 MM HG: CPT | Performed by: FAMILY MEDICINE

## 2023-12-21 PROCEDURE — 1170F FXNL STATUS ASSESSED: CPT | Performed by: FAMILY MEDICINE

## 2023-12-21 PROCEDURE — 90662 IIV NO PRSV INCREASED AG IM: CPT | Performed by: FAMILY MEDICINE

## 2023-12-21 PROCEDURE — G0008 ADMIN INFLUENZA VIRUS VAC: HCPCS | Performed by: FAMILY MEDICINE

## 2023-12-21 PROCEDURE — 3074F SYST BP LT 130 MM HG: CPT | Performed by: FAMILY MEDICINE

## 2023-12-21 RX ORDER — ESCITALOPRAM OXALATE 10 MG/1
15 TABLET ORAL DAILY
Qty: 135 TABLET | Refills: 3 | Status: SHIPPED | OUTPATIENT
Start: 2023-12-21 | End: 2024-12-15

## 2023-12-21 NOTE — PROGRESS NOTES
The ABCs of the Annual Wellness Visit  Subsequent Medicare Wellness Visit    Subjective    History of Present Illness:  Savannah Bruner is a 68 y.o. female who presents for a Subsequent Medicare Wellness Visit.    The following portions of the patient's history were reviewed and   updated as appropriate: allergies, current medications, past family history, past medical history, past social history, past surgical history, and problem list.  Family History   Problem Relation Age of Onset    Cancer Mother     Diabetes Maternal Grandmother     Heart disease Maternal Grandmother     Cancer Maternal Grandmother      Past Surgical History:   Procedure Laterality Date     SECTION      HYSTERECTOMY      JOINT REPLACEMENT  2019    SKIN LESION EXCISION      TONSILLECTOMY      TOTAL HIP ARTHROPLASTY Right 2019    Procedure: anterior TOTAL HIP ARTHROPLASTY;  Surgeon: Daniel Tolentino MD;  Location: [facility] MAIN OR;  Service: Orthopedics        Compared to one year ago, the patient feels her physical   health is the same.    Compared to one year ago, the patient feels her mental   health is the same.    Recent Hospitalizations:  She was not admitted to the hospital during the last year.       Current Medical Providers:  Patient Care Team:  Jay Durán MD as PCP - General (Family Medicine)    Outpatient Medications Prior to Visit   Medication Sig Dispense Refill    escitalopram (LEXAPRO) 10 MG tablet TAKE 1 & 1/2 (ONE & ONE-HALF) TABLETS BY MOUTH ONCE DAILY 45 tablet 0    Ferrous Sulfate (IRON) 28 MG tablet Take  by mouth Daily.      latanoprost (XALATAN) 0.005 % ophthalmic solution INSTILL 1 DROP INTO EACH EYE AT BEDTIME      azithromycin (ZITHROMAX) 250 MG tablet Take 2 tablets the first day, then 1 tablet daily for 4 days. 6 tablet 0    bimatoprost (LUMIGAN) 0.03 % ophthalmic drops Administer 1 drop to both eyes Daily.      diphenoxylate-atropine (Lomotil) 2.5-0.025 MG per tablet Take 1 tablet by mouth 3  "(Three) Times a Day As Needed for Diarrhea. 12 tablet 0    LUMIGAN 0.01 % ophthalmic drops INSTILL 1 DROP INTO EACH EYE daily  6    ondansetron (Zofran) 4 MG tablet Take 1 tablet by mouth Every 8 (Eight) Hours As Needed for Nausea or Vomiting. 20 tablet 0     No facility-administered medications prior to visit.     Pain Score    23 1408   PainSc: 0-No pain      No opioid medication identified on active medication list. I have reviewed chart for other potential  high risk medication/s and harmful drug interactions in the elderly.        Aspirin is not on active medication list.  Aspirin use is not indicated based on review of current medical condition/s. Risk of harm outweighs potential benefits.  .    Patient Active Problem List   Diagnosis    Polyarthropathy or polyarthritis    Anxiety    Chronic angle-closure glaucoma    Depressive disorder    Hyperlipidemia    Hypertension, benign    Primary osteoarthritis of right knee    Overweight (BMI 25.0-29.9)    History of total right hip replacement     Advance Care Planning  Advance Directive is not on file.  ACP discussion was held with the patient during this visit. Patient does not have an advance directive, information provided.          Objective    Vitals:    23 1408   BP: 124/80   BP Location: Right arm   Patient Position: Sitting   Cuff Size: Adult   Pulse: 85   Resp: 20   SpO2: 95%   Weight: 65.3 kg (144 lb)   Height: 165.1 cm (65\")   PainSc: 0-No pain       Does the patient have evidence of cognitive impairment? No           HEALTH RISK ASSESSMENT    Smoking Status:  Social History     Tobacco Use   Smoking Status Never    Passive exposure: Never   Smokeless Tobacco Never     Alcohol Consumption:  Social History     Substance and Sexual Activity   Alcohol Use No     Fall Risk Screen:    STEADI Fall Risk Assessment was completed, and patient is at LOW risk for falls.Assessment completed on:2023    Depression Screenin/21/2023     2:11 " PM   PHQ-2/PHQ-9 Depression Screening   Little Interest or Pleasure in Doing Things 0-->not at all   Feeling Down, Depressed or Hopeless 0-->not at all   PHQ-9: Brief Depression Severity Measure Score 0       Health Habits and Functional and Cognitive Screenin/21/2023    11:23 AM   Functional & Cognitive Status   Do you have difficulty preparing food and eating? No   Do you have difficulty bathing yourself, getting dressed or grooming yourself? No   Do you have difficulty using the toilet? No   Do you have difficulty moving around from place to place? No   Do you have trouble with steps or getting out of a bed or a chair? No   Current Diet Unhealthy Diet   Dental Exam Up to date   Eye Exam Up to date   Exercise (times per week) Other   Do you need help using the phone?  No   Are you deaf or do you have serious difficulty hearing?  No   Do you need help to go to places out of walking distance? No   Do you need help shopping? No   Do you need help preparing meals?  No   Do you need help with housework?  No   Do you need help with laundry? No   Do you need help taking your medications? No   Do you need help managing money? No   Do you ever drive or ride in a car without wearing a seat belt? No   Have you felt unusual stress, anger or loneliness in the last month? No   Who do you live with? Spouse   If you need help, do you have trouble finding someone available to you? No   Have you been bothered in the last four weeks by sexual problems? No       Age-appropriate Screening Schedule:  Refer to the list below for future screening recommendations based on patient's age, sex and/or medical conditions. Orders for these recommended tests are listed in the plan section. The patient has been provided with a written plan.    Health Maintenance   Topic Date Due    MAMMOGRAM  Never done    DXA SCAN  Never done    COLORECTAL CANCER SCREENING  Never done    COVID-19 Vaccine (1) Never done    TDAP/TD VACCINES (1 - Tdap)  Never done    ZOSTER VACCINE (1 of 2) Never done    Pneumococcal Vaccine 65+ (1 - PCV) Never done    LIPID PANEL  2023    INFLUENZA VACCINE  2023    ANNUAL WELLNESS VISIT  2024    HEPATITIS C SCREENING  Completed              Assessment & Plan   Medically necessary, significant, and separately identifiable medical problems identified during this visit are addressed on a separate visit note.    CMS Preventative Services Quick Reference  Risk Factors Identified During Encounter  None Identified  The above risks/problems have been discussed with the patient.  Follow up actions/plans if indicated are seen below in the Assessment/Plan Section.  Pertinent information has been shared with the patient in the After Visit Summary.    Follow Up:   In 6 months or as clinical condition warrants.     An After Visit Summary and PPPS were made available to the patient.    Medicare Wellness  Personal Prevention Plan of Service     Date of Office Visit:  2023  Encounter Provider:  Jay Durán MD  Place of Service:  [facility]  Patient Name: Savannah Bruner  :  1955    As part of the Medicare Wellness portion of your visit today, we are providing you with this personalized preventive plan of services (PPPS). This plan is based upon recommendations of the [place] Preventive Services Task Force (USPSTF) and the Advisory Committee on Immunization Practices (ACIP).    This lists the preventive care services that should be considered, and provides dates of when you are due. Items listed as completed are up-to-date and do not require any further intervention.    Health Maintenance   Topic Date Due    MAMMOGRAM  Never done    DXA SCAN  Never done    COLORECTAL CANCER SCREENING  Never done    COVID-19 Vaccine (1) Never done    TDAP/TD VACCINES (1 - Tdap) Never done    ZOSTER VACCINE (1 of 2) Never done    Pneumococcal Vaccine 65+ (1 - PCV) Never done    LIPID  PANEL  02/01/2023    INFLUENZA VACCINE  08/01/2023    ANNUAL WELLNESS VISIT  12/21/2024    HEPATITIS C SCREENING  Completed       Orders Placed This Encounter   Procedures    POCT urinalysis dipstick, manual     Order Specific Question:   Release to patient     Answer:   Routine Release [[id number]]       No follow-ups on file.  Sit-to-Stand Exercise    The sit-to-stand exercise (also known as the chair stand or chair rise exercise) strengthens your lower body and helps you maintain or improve your mobility and independence. The goal is to do the sit-to-stand exercise without using your hands. This will be easier as you become stronger. You should always talk with your health care provider before starting any exercise program, especially if you have had recent surgery.  Do the exercise exactly as told by your health care provider and adjust it as directed. It is normal to feel mild stretching, pulling, tightness, or discomfort as you do this exercise, but you should stop right away if you feel sudden pain or your pain gets worse. Do not begin doing this exercise until told by your health care provider.  What the sit-to-stand exercise does  The sit-to-stand exercise helps to strengthen the muscles in your thighs and the muscles in the center of your body that give you stability (core muscles). This exercise is especially helpful if:  You have had knee or hip surgery.  You have trouble getting up from a chair, out of a car, or off the toilet.  How to do the sit-to-stand exercise  Sit toward the front edge of a sturdy chair without armrests. Your knees should be bent and your feet should be flat on the floor and shoulder-width apart.  Place your hands lightly on each side of the seat. Keep your back and neck as straight as possible, with your chest slightly forward.  Breathe in slowly. Lean forward and slightly shift your weight to the front of your feet.  Breathe out as you slowly stand up. Use your hands as little as  possible.  Stand and pause for a full breath in and out.  Breathe in as you sit down slowly. Tighten your core and abdominal muscles to control your lowering as much as possible.  Breathe out slowly.  Do this exercise 10-15 times. If needed, do it fewer times until you build up strength.  Rest for 1 minute, then do another set of 10-15 repetitions.  To change the difficulty of the sit-to-stand exercise  If the exercise is too difficult, use a chair with sturdy armrests, and push off the armrests to help you come to the standing position. You can also use the armrests to help slowly lower yourself back to sitting. As this gets easier, try to use your arms less. You can also place a firm cushion or pillow on the chair to make the surface higher.  If this exercise is too easy, do not use your arms to help raise or lower yourself. You can also wear a weighted vest, use hand weights, increase your repetitions, or try a lower chair.  General tips  You may feel tired when starting an exercise routine. This is normal.  You may have muscle soreness that lasts a few days. This is normal. As you get stronger, you may not feel muscle soreness.  Use smooth, steady movements.  Do not  hold your breath during strength exercises. This can cause unsafe changes in your blood pressure.  Breathe in slowly through your nose, and breathe out slowly through your mouth.  Summary  Strengthening your lower body is an important step to help you move safely and independently.  The sit-to-stand exercise helps strengthen the muscles in your thighs and core.  You should always talk with your health care provider before starting any exercise program, especially if you have had recent surgery.  This information is not intended to replace advice given to you by your health care provider. Make sure you discuss any questions you have with your health care provider.  Document Revised: 10/16/2019 Document Reviewed: 02/08/2018  Elsevier Patient Education  © 2021 [employer]    Advance Care Planning and Advance Directives     You make decisions on a daily basis - decisions about where you want to live, your career, your home, your life. Perhaps one of the most important decisions you face is your choice for future medical care. Take time to talk with your family and your healthcare team and start planning today.  Advance Care Planning is a process that can help you:  Understand possible future healthcare decisions in light of your own experiences  Reflect on those decision in light of your goals and values  Discuss your decisions with those closest to you and the healthcare professionals that care for you  Make a plan by creating a document that reflects your wishes    Surrogate Decision Maker  In the event of a medical emergency, which has left you unable to communicate or to make your own decisions, you would need someone to make decisions for you.  It is important to discuss your preferences for medical treatment with this person while you are in good health.     Qualities of a surrogate decision maker:  Willing to take on this role and responsibility  Knows what you want for future medical care  Willing to follow your wishes even if they don't agree with them  Able to make difficult medical decisions under stressful circumstances    Advance Directives  These are legal documents you can create that will guide your healthcare team and decision maker(s) when needed. These documents can be stored in the electronic medical record.    Living Will - a legal document to guide your care if you have a terminal condition or a serious illness and are unable to communicate. States vary by statute in document names/types, but most forms may include one or more of the following:        -  Directions regarding life-prolonging treatments        -  Directions regarding artificially provided nutrition/hydration        -  Choosing a healthcare decision maker        -  Direction  regarding organ/tissue donation    Durable Power of  for Healthcare - this document names an -in-fact to make medical decisions for you, but it may also allow this person to make personal and financial decisions for you. Please seek the advice of an  if you need this type of document.    **Advance Directives are not required and no one may discriminate against you if you do not sign one.    Medical Orders  Many states allow specific forms/orders signed by your physician to record your wishes for medical treatment in your current state of health. This form, signed in personal communication with your physician, addresses resuscitation and other medical interventions that you may or may not want.  For more information or to schedule a time with a [facility] Advance Care Planning Facilitator contact: [facility] or call [phone] and someone will contact you directly.    Fall Prevention in the Home, Adult  Falls can cause injuries and can happen to people of all ages. There are many things you can do to make your home safe and to help prevent falls. Ask for help when making these changes.  What actions can I take to prevent falls?  General Instructions  Use good lighting in all rooms. Replace any light bulbs that burn out.  Turn on the lights in dark areas. Use night-lights.  Keep items that you use often in easy-to-reach places. Lower the shelves around your home if needed.  Set up your furniture so you have a clear path. Avoid moving your furniture around.  Do not have throw rugs or other things on the floor that can make you trip.  Avoid walking on wet floors.  If any of your floors are uneven, fix them.  Add color or contrast paint or tape to clearly rima and help you see:  Grab bars or handrails.  First and last steps of staircases.  Where the edge of each step is.  If you use a stepladder:  Make sure that it is fully opened. Do not climb a closed stepladder.  Make sure the  sides of the stepladder are locked in place.  Ask someone to hold the stepladder while you use it.  Know where your pets are when moving through your home.  What can I do in the bathroom?         Keep the floor dry. Clean up any water on the floor right away.  Remove soap buildup in the tub or shower.  Use nonskid mats or decals on the floor of the tub or shower.  Attach bath mats securely with double-sided, nonslip rug tape.  If you need to sit down in the shower, use a plastic, nonslip stool.  Install grab bars by the toilet and in the tub and shower. Do not use towel bars as grab bars.  What can I do in the bedroom?  Make sure that you have a light by your bed that is easy to reach.  Do not use any sheets or blankets for your bed that hang to the floor.  Have a firm chair with side arms that you can use for support when you get dressed.  What can I do in the kitchen?  Clean up any spills right away.  If you need to reach something above you, use a step stool with a grab bar.  Keep electrical cords out of the way.  Do not use floor polish or wax that makes floors slippery.  What can I do with my stairs?  Do not leave any items on the stairs.  Make sure that you have a light switch at the top and the bottom of the stairs.  Make sure that there are handrails on both sides of the stairs. Fix handrails that are broken or loose.  Install nonslip stair treads on all your stairs.  Avoid having throw rugs at the top or bottom of the stairs.  Choose a carpet that does not hide the edge of the steps on the stairs.  Check carpeting to make sure that it is firmly attached to the stairs. Fix carpet that is loose or worn.  What can I do on the outside of my home?  Use bright outdoor lighting.  Fix the edges of walkways and driveways and fix any cracks.  Remove anything that might make you trip as you walk through a door, such as a raised step or threshold.  Trim any bushes or trees on paths to your home.  Check to see if  handrails are loose or broken and that both sides of all steps have handrails.  Install guardrails along the edges of any raised decks and porches.  Clear paths of anything that can make you trip, such as tools or rocks.  Have leaves, snow, or ice cleared regularly.  Use sand or salt on paths during winter.  Clean up any spills in your garage right away. This includes grease or oil spills.  What other actions can I take?  Wear shoes that:  Have a low heel. Do not wear high heels.  Have rubber bottoms.  Feel good on your feet and fit well.  Are closed at the toe. Do not wear open-toe sandals.  Use tools that help you move around if needed. These include:  Canes.  Walkers.  Scooters.  Crutches.  Review your medicines with your doctor. Some medicines can make you feel dizzy. This can increase your chance of falling.  Ask your doctor what else you can do to help prevent falls.  Where to find more information  Centers for Disease Control and Prevention[place]: www.cdc.gov  [place] on Aging: www.pia.nih.gov  Contact a doctor if:  You are afraid of falling at home.  You feel weak, drowsy, or dizzy at home.  You fall at home.  Summary  There are many simple things that you can do to make your home safe and to help prevent falls.  Ways to make your home safe include removing things that can make you trip and installing grab bars in the bathroom.  Ask for help when making these changes in your home.  This information is not intended to replace advice given to you by your health care provider. Make sure you discuss any questions you have with your health care provider.  Document Revised: 07/21/2021 Document Reviewed: 07/21/2021  Elsevier Patient Education © 2021 [employer]

## 2023-12-21 NOTE — PROGRESS NOTES
Subjective   Savannah Bruner is a 68 y.o. female.   Chief Complaint   Patient presents with    Medicare Wellness-subsequent    Anxiety       History of Present Illness  The patient is here: for coordination of medical care.  Patient has: moderate activity with work/home activities    MAMMOGRAM Never done  DXA SCAN Never done  COLORECTAL CANCER SCREENING Never done  COVID-19 Vaccine(1) Never done  TDAP/TD VACCINES(1 - Tdap) Never done  ZOSTER VACCINE(1 of 2) Never done  Pneumococcal Vaccine 65+(1 - PCV) Never done  LIPID PANEL due on 02/01/2023  INFLUENZA VACCINE due on 08/01/2023  ANNUAL WELLNESS VISIT due on 12/21/2024  HEPATITIS C SCREENING Completed  Current pain scale 0/10.    Needs refill on lexapro for anxiety. Stable on meds   Anxiety  Presents for follow-up visit. Symptoms include nervous/anxious behavior. Patient reports no chest pain, depressed mood, nausea, shortness of breath or suicidal ideas. Symptoms occur most days.            The following portions of the patient's history were reviewed and updated as appropriate: allergies, current medications, past family history, past medical history, past social history, past surgical history, and problem list.    Patient Active Problem List   Diagnosis    Polyarthropathy or polyarthritis    Anxiety    Chronic angle-closure glaucoma    Depressive disorder    Hyperlipidemia    Hypertension, benign    Primary osteoarthritis of right knee    Overweight (BMI 25.0-29.9)    History of total right hip replacement       Current Outpatient Medications on File Prior to Visit   Medication Sig Dispense Refill    Ferrous Sulfate (IRON) 28 MG tablet Take  by mouth Daily.      latanoprost (XALATAN) 0.005 % ophthalmic solution INSTILL 1 DROP INTO EACH EYE AT BEDTIME      [DISCONTINUED] escitalopram (LEXAPRO) 10 MG tablet TAKE 1 & 1/2 (ONE & ONE-HALF) TABLETS BY MOUTH ONCE DAILY 45 tablet 0    [DISCONTINUED] azithromycin (ZITHROMAX) 250 MG tablet Take 2 tablets the first day, then  "1 tablet daily for 4 days. 6 tablet 0    [DISCONTINUED] bimatoprost (LUMIGAN) 0.03 % ophthalmic drops Administer 1 drop to both eyes Daily.      [DISCONTINUED] diphenoxylate-atropine (Lomotil) 2.5-0.025 MG per tablet Take 1 tablet by mouth 3 (Three) Times a Day As Needed for Diarrhea. 12 tablet 0    [DISCONTINUED] LUMIGAN 0.01 % ophthalmic drops INSTILL 1 DROP INTO EACH EYE daily  6    [DISCONTINUED] ondansetron (Zofran) 4 MG tablet Take 1 tablet by mouth Every 8 (Eight) Hours As Needed for Nausea or Vomiting. 20 tablet 0     No current facility-administered medications on file prior to visit.     Current outpatient and discharge medications have been reconciled for the patient.  Reviewed by: Jay Durán MD      Allergies   Allergen Reactions    Latex Itching     Hands itch not diagnosed       Review of Systems   Constitutional:  Negative for activity change, appetite change, fatigue and fever.   HENT:  Negative for ear pain, swollen glands and voice change.    Eyes:  Negative for visual disturbance.   Respiratory:  Negative for shortness of breath and wheezing.    Cardiovascular:  Negative for chest pain and leg swelling.   Gastrointestinal:  Negative for abdominal pain, blood in stool, constipation, diarrhea, nausea and vomiting.   Endocrine: Negative for polydipsia and polyuria.   Genitourinary:  Negative for dysuria, frequency and hematuria.   Musculoskeletal:  Negative for joint swelling, neck pain and neck stiffness.   Skin:  Negative for rash and wound.   Neurological:  Negative for weakness, numbness and headache.   Psychiatric/Behavioral:  Negative for suicidal ideas and depressed mood. The patient is nervous/anxious.      I have reviewed and confirmed the accuracy of the ROS as documented by the MA/LPN/RN Jay Durán MD    Objective   Visit Vitals  /80 (BP Location: Right arm, Patient Position: Sitting, Cuff Size: Adult)   Pulse 85   Resp 20   Ht 165.1 cm (65\")   Wt 65.3 kg (144 " lb)   SpO2 95%   BMI 23.96 kg/m²      **  Physical Exam  Constitutional:       Appearance: She is well-developed.   HENT:      Head: Normocephalic and atraumatic.      Right Ear: External ear normal.      Left Ear: External ear normal.      Nose: Nose normal.   Eyes:      Pupils: Pupils are equal, round, and reactive to light.   Cardiovascular:      Rate and Rhythm: Normal rate and regular rhythm.      Heart sounds: Normal heart sounds.   Pulmonary:      Effort: Pulmonary effort is normal.      Breath sounds: Normal breath sounds.   Abdominal:      General: Bowel sounds are normal.      Palpations: Abdomen is soft.   Musculoskeletal:         General: Normal range of motion.      Cervical back: Normal range of motion and neck supple.   Skin:     General: Skin is warm and dry.   Neurological:      Mental Status: She is alert and oriented to person, place, and time.   Psychiatric:         Behavior: Behavior normal.         Thought Content: Thought content normal.         Judgment: Judgment normal.       Derm Physical Exam  Ortho Exam   Neurologic Exam     Mental Status   Oriented to person, place, and time.     Cranial Nerves     CN III, IV, VI   Pupils are equal, round, and reactive to light.       Diagnoses and all orders for this visit:    1. Medicare annual wellness visit, subsequent (Primary)  -     POCT urinalysis dipstick, manual    2. Anxiety  Overview:  stable    Orders:  -     escitalopram (LEXAPRO) 10 MG tablet; Take 1.5 tablets by mouth Daily for 360 days.  Dispense: 135 tablet; Refill: 3    3. Hypertension, benign  Overview:  will follow. Previously normal    Orders:  -     CBC & Differential  -     Comprehensive Metabolic Panel    4. Mixed hyperlipidemia  Overview:  Recheck labs     Orders:  -     Comprehensive Metabolic Panel  -     Lipid Panel With / Chol / HDL Ratio  -     TSH    Discussed anticipatory guidance, diet, exercise, and weight loss.  Discussed safety and routine screening examinations.   Discussed self-examinations.  Savannah Bruner  reports that she has never smoked. She has never been exposed to tobacco smoke. She has never used smokeless tobacco..   Follow-up for routine health maintenance as indicated.    BMI is within normal parameters. No other follow-up for BMI required.      Expected course, medications, and adverse effects discussed as appropriate.  Call or return if worsening or persistent symptoms.     This document is intended for medical professional use only.

## 2023-12-21 NOTE — PATIENT INSTRUCTIONS
Medicare Wellness  Personal Prevention Plan of Service     Date of Office Visit:    Encounter Provider:  Jay Durán MD  Place of Service:  [facility]  Patient Name: Savannah Bruner  :  1955    As part of the Medicare Wellness portion of your visit today, we are providing you with this personalized preventive plan of services (PPPS). This plan is based upon recommendations of the [place] Preventive Services Task Force (USPSTF) and the Advisory Committee on Immunization Practices (ACIP).    This lists the preventive care services that should be considered, and provides dates of when you are due. Items listed as completed are up-to-date and do not require any further intervention.    Health Maintenance   Topic Date Due    MAMMOGRAM  Never done    DXA SCAN  Never done    COLORECTAL CANCER SCREENING  Never done    COVID-19 Vaccine (1) Never done    TDAP/TD VACCINES (1 - Tdap) Never done    ZOSTER VACCINE (1 of 2) Never done    Pneumococcal Vaccine 65+ (1 - PCV) Never done    LIPID PANEL  2023    INFLUENZA VACCINE  2023    ANNUAL WELLNESS VISIT  2024    HEPATITIS C SCREENING  Completed       Orders Placed This Encounter   Procedures    Comprehensive Metabolic Panel     Order Specific Question:   Release to patient     Answer:   Routine Release [[id number]]    Lipid Panel With / Chol / HDL Ratio     Order Specific Question:   Release to patient     Answer:   Routine Release [[id number]]    TSH     Order Specific Question:   Release to patient     Answer:   Routine Release [[id number]]    POCT urinalysis dipstick, manual     Order Specific Question:   Release to patient     Answer:   Routine Release [[id number]]    CBC & Differential     Order Specific Question:   Manual Differential     Answer:   No     Order Specific Question:   Release to patient     Answer:   Routine Release [[id number]]       No follow-ups on file.              Sit-to-Stand  Exercise    The sit-to-stand exercise (also known as the chair stand or chair rise exercise) strengthens your lower body and helps you maintain or improve your mobility and independence. The goal is to do the sit-to-stand exercise without using your hands. This will be easier as you become stronger. You should always talk with your health care provider before starting any exercise program, especially if you have had recent surgery.  Do the exercise exactly as told by your health care provider and adjust it as directed. It is normal to feel mild stretching, pulling, tightness, or discomfort as you do this exercise, but you should stop right away if you feel sudden pain or your pain gets worse. Do not begin doing this exercise until told by your health care provider.  What the sit-to-stand exercise does  The sit-to-stand exercise helps to strengthen the muscles in your thighs and the muscles in the center of your body that give you stability (core muscles). This exercise is especially helpful if:  You have had knee or hip surgery.  You have trouble getting up from a chair, out of a car, or off the toilet.  How to do the sit-to-stand exercise  Sit toward the front edge of a sturdy chair without armrests. Your knees should be bent and your feet should be flat on the floor and shoulder-width apart.  Place your hands lightly on each side of the seat. Keep your back and neck as straight as possible, with your chest slightly forward.  Breathe in slowly. Lean forward and slightly shift your weight to the front of your feet.  Breathe out as you slowly stand up. Use your hands as little as possible.  Stand and pause for a full breath in and out.  Breathe in as you sit down slowly. Tighten your core and abdominal muscles to control your lowering as much as possible.  Breathe out slowly.  Do this exercise 10-15 times. If needed, do it fewer times until you build up strength.  Rest for 1 minute, then do another set of 10-15  repetitions.  To change the difficulty of the sit-to-stand exercise  If the exercise is too difficult, use a chair with sturdy armrests, and push off the armrests to help you come to the standing position. You can also use the armrests to help slowly lower yourself back to sitting. As this gets easier, try to use your arms less. You can also place a firm cushion or pillow on the chair to make the surface higher.  If this exercise is too easy, do not use your arms to help raise or lower yourself. You can also wear a weighted vest, use hand weights, increase your repetitions, or try a lower chair.  General tips  You may feel tired when starting an exercise routine. This is normal.  You may have muscle soreness that lasts a few days. This is normal. As you get stronger, you may not feel muscle soreness.  Use smooth, steady movements.  Do not  hold your breath during strength exercises. This can cause unsafe changes in your blood pressure.  Breathe in slowly through your nose, and breathe out slowly through your mouth.  Summary  Strengthening your lower body is an important step to help you move safely and independently.  The sit-to-stand exercise helps strengthen the muscles in your thighs and core.  You should always talk with your health care provider before starting any exercise program, especially if you have had recent surgery.  This information is not intended to replace advice given to you by your health care provider. Make sure you discuss any questions you have with your health care provider.  Document Revised: 10/16/2019 Document Reviewed: 02/08/2018  Elsevier Patient Education © 2021 [employer]vier Inc.       Advance Care Planning and Advance Directives     You make decisions on a daily basis - decisions about where you want to live, your career, your home, your life. Perhaps one of the most important decisions you face is your choice for future medical care. Take time to talk with your family and your healthcare  team and start planning today.  Advance Care Planning is a process that can help you:  Understand possible future healthcare decisions in light of your own experiences  Reflect on those decision in light of your goals and values  Discuss your decisions with those closest to you and the healthcare professionals that care for you  Make a plan by creating a document that reflects your wishes    Surrogate Decision Maker  In the event of a medical emergency, which has left you unable to communicate or to make your own decisions, you would need someone to make decisions for you.  It is important to discuss your preferences for medical treatment with this person while you are in good health.     Qualities of a surrogate decision maker:  Willing to take on this role and responsibility  Knows what you want for future medical care  Willing to follow your wishes even if they don't agree with them  Able to make difficult medical decisions under stressful circumstances    Advance Directives  These are legal documents you can create that will guide your healthcare team and decision maker(s) when needed. These documents can be stored in the electronic medical record.    Living Will - a legal document to guide your care if you have a terminal condition or a serious illness and are unable to communicate. States vary by statute in document names/types, but most forms may include one or more of the following:        -  Directions regarding life-prolonging treatments        -  Directions regarding artificially provided nutrition/hydration        -  Choosing a healthcare decision maker        -  Direction regarding organ/tissue donation    Durable Power of  for Healthcare - this document names an -in-fact to make medical decisions for you, but it may also allow this person to make personal and financial decisions for you. Please seek the advice of an  if you need this type of document.    **Advance Directives are  not required and no one may discriminate against you if you do not sign one.    Medical Orders  Many states allow specific forms/orders signed by your physician to record your wishes for medical treatment in your current state of health. This form, signed in personal communication with your physician, addresses resuscitation and other medical interventions that you may or may not want.      For more information or to schedule a time with a [facility] Advance Care Planning Facilitator contact: [facility] or call [phone] and someone will contact you directly.    Fall Prevention in the Home, Adult  Falls can cause injuries. They can happen to people of all ages. There are many things you can do to make your home safe and to help prevent falls. Ask for help when making these changes, if needed.  What actions can I take to prevent falls?  General Instructions  Use good lighting in all rooms. Replace any light bulbs that burn out.  Turn on the lights when you go into a dark area. Use night-lights.  Keep items that you use often in easy-to-reach places. Lower the shelves around your home if necessary.  Set up your furniture so you have a clear path. Avoid moving your furniture around.  Do not have throw rugs and other things on the floor that can make you trip.  Avoid walking on wet floors.  If any of your floors are uneven, fix them.  Add color or contrast paint or tape to clearly rima and help you see:  Any grab bars or handrails.  First and last steps of stairways.  Where the edge of each step is.  If you use a stepladder:  Make sure that it is fully opened. Do not climb a closed stepladder.  Make sure that both sides of the stepladder are locked into place.  Ask someone to hold the stepladder for you while you use it.  If there are any pets around you, be aware of where they are.  What can I do in the bathroom?         Keep the floor dry. Clean up any water that spills onto the floor as soon as it  happens.  Remove soap buildup in the tub or shower regularly.  Use non-skid mats or decals on the floor of the tub or shower.  Attach bath mats securely with double-sided, non-slip rug tape.  If you need to sit down in the shower, use a plastic, non-slip stool.  Install grab bars by the toilet and in the tub and shower. Do not use towel bars as grab bars.  What can I do in the bedroom?  Make sure that you have a light by your bed that is easy to reach.  Do not use any sheets or blankets that are too big for your bed. They should not hang down onto the floor.  Have a firm chair that has side arms. You can use this for support while you get dressed.  What can I do in the kitchen?  Clean up any spills right away.  If you need to reach something above you, use a strong step stool that has a grab bar.  Keep electrical cords out of the way.  Do not use floor polish or wax that makes floors slippery. If you must use wax, use non-skid floor wax.  What can I do with my stairs?  Do not leave any items on the stairs.  Make sure that you have a light switch at the top of the stairs and the bottom of the stairs. If you do not have them, ask someone to add them for you.  Make sure that there are handrails on both sides of the stairs, and use them. Fix handrails that are broken or loose. Make sure that handrails are as long as the stairways.  Install non-slip stair treads on all stairs in your home.  Avoid having throw rugs at the top or bottom of the stairs. If you do have throw rugs, attach them to the floor with carpet tape.  Choose a carpet that does not hide the edge of the steps on the stairway.  Check any carpeting to make sure that it is firmly attached to the stairs. Fix any carpet that is loose or worn.  What can I do on the outside of my home?  Use bright outdoor lighting.  Regularly fix the edges of walkways and driveways and fix any cracks.  Remove anything that might make you trip as you walk through a door, such as  a raised step or threshold.  Trim any bushes or trees on the path to your home.  Regularly check to see if handrails are loose or broken. Make sure that both sides of any steps have handrails.  Install guardrails along the edges of any raised decks and porches.  Clear walking paths of anything that might make someone trip, such as tools or rocks.  Have any leaves, snow, or ice cleared regularly.  Use sand or salt on walking paths during winter.  Clean up any spills in your garage right away. This includes grease or oil spills.  What other actions can I take?  Wear shoes that:  Have a low heel. Do not wear high heels.  Have rubber bottoms.  Are comfortable and fit you well.  Are closed at the toe. Do not wear open-toe sandals.  Use tools that help you move around (mobility aids) if they are needed. These include:  Canes.  Walkers.  Scooters.  Crutches.  Review your medicines with your doctor. Some medicines can make you feel dizzy. This can increase your chance of falling.  Ask your doctor what other things you can do to help prevent falls.  Where to find more information  Centers for Disease Control and Prevention, [place]: https://cdc.gov  [place] on Aging: https://cg2ngud.pia.nih.gov  Contact a doctor if:  You are afraid of falling at home.  You feel weak, drowsy, or dizzy at home.  You fall at home.  Summary  There are many simple things that you can do to make your home safe and to help prevent falls.  Ways to make your home safe include removing tripping hazards and installing grab bars in the bathroom.  Ask for help when making these changes in your home.  This information is not intended to replace advice given to you by your health care provider. Make sure you discuss any questions you have with your health care provider.  Document Revised: 04/09/2020 Document Reviewed: 08/02/2018  Elsevier Patient Education © 2021 Elsevier Inc.   Preventing Unhealthy Weight Gain, Adult  Staying at a healthy weight  is important to your overall health. When fat builds up in your body, you may become overweight or obese. Being overweight or obese increases your risk of developing various health problems.  Unhealthy weight gain is often the result of making unhealthy food choices or not getting enough exercise. You can make changes to your lifestyle to prevent obesity and stay as healthy as possible.  How can unhealthy weight gain affect me?  Being overweight or obese can cause you to develop joint or bone problems, which can make it hard for you to stay active or do activities you enjoy. Being overweight also puts stress on your heart and lungs and can lead to health problems such as:  Diabetes.  Heart disease.  Some types of cancer.  Stroke.  Eating healthy, staying active, and having healthy habits can help to prevent unhealthy weight gain and lower your risk for health problems. These lifestyle changes will also help you manage stress and emotions, improve your self-esteem, and connect with friends and family.  What can increase my risk?  In addition to certain eating and lifestyle choices, some other factors that may make you more likely to have unhealthy weight gain include:  Having a family history of obesity.  Living in an area with limited access to:  [place], recreation centers, or sidewalks.  Healthy food choices, such as grocery stores and [place] markets.  What actions can I take to prevent unhealthy weight gain?  Nutrition    Eat only as much as your body needs. To do this:  Pay attention to signs that you are hungry or full. Stop eating as soon as you feel full.  If you feel hungry, try drinking water first before eating. Drink enough water so your urine is pale yellow.  Eat smaller portions. Pay attention to portion sizes when eating out.  Look at serving sizes on food labels. Most foods contain more than one serving per container.  Eat the recommended number of calories for your gender and activity level. For most  active people, a daily total of 2,000 calories is appropriate. If you are trying to lose weight or are not very active, you may need to eat fewer calories. Talk with your health care provider or a dietitian about how many calories you need each day.  Choose healthy foods, such as:  Fruits and vegetables. At each meal, try to fill at least half of your plate with fruits and vegetables.  Whole grains, such as whole-wheat bread, brown rice, and quinoa.  Lean meats, such as chicken or fish.  Other healthy proteins, such as beans, eggs, or tofu.  Healthy fats, such as nuts, seeds, fatty fish, and olive oil.  Low-fat or fat-free dairy products.  Check food labels, and avoid food and drinks that:  Are high in calories.  Have added sugar.  Are high in sodium.  Have saturated fats or trans fats.  Cook foods in healthier ways, such as by baking, broiling, or grilling.  Make a meal plan for the week, and shop with a grocery list to help you stay on track with your purchases. Try to avoid going to the grocery store when you are hungry.  When grocery shopping, try to shop around the outside of the store first, where the fresh foods are. Doing this helps you avoid prepackaged foods, which can be high in sugar, salt (sodium), and fat.  Lifestyle    Exercise for 30 or more minutes on 5 or more days each week. Exercising may include brisk walking, yard work, biking, running, swimming, and team sports like basketball and soccer. Ask your health care provider which exercises are safe for you.  Do activities that strengthen the muscles, such as lifting weights or using resistance bands, on 2 or more days a week.  Do not use any products that contain nicotine or tobacco. These products include cigarettes, chewing tobacco, and vaping devices, such as e-cigarettes. If you need help quitting, ask your health care provider.  If you drink alcohol:  Limit how much you have to:  0-1 drink a day for women who are not pregnant.  0-2 drinks a day  for men.  Know how much alcohol is in a drink. In the [place] one drink equals one 12 oz bottle of beer (355 mL), one 5 oz glass of wine (148 mL), or one 1½ oz glass of hard liquor (44 mL).  Try to get 7-9 hours of sleep each night.  Other changes  Keep a food and activity journal to keep track of:  What you ate and how many calories you had. Remember to count the calories in sauces, dressings, and side dishes.  Whether you were active, and what exercises you did.  Your calorie, weight, and activity goals.  Check your weight regularly. Track any changes. If you notice that you have gained weight, make changes to your diet or activity routine.  Avoid taking weight-loss medicines or supplements. Talk to your health care provider before starting any new medicine or supplement.  Talk to your health care provider before trying any new diet or exercise plan.  Where to find more information  Talk with your health care provider or a dietitian about healthy eating and healthy lifestyle choices. You may also find information from:  [place] Department of Agriculture, [employer]: www.choosemyplate.gov  American Heart Association: www.heart.org  Centers for Disease Control and Prevention: www.cdc.gov  Summary  Eating healthy, staying active, and having healthy habits can help to prevent unhealthy weight gain and lower your risk for health problems such as heart disease, diabetes, some types of cancer, and stroke.  Being overweight or obese can cause you to develop joint or bone problems, which can make it hard for you to stay active or do activities you enjoy.  You can prevent unhealthy weight gain by eating a healthy diet, exercising regularly, not smoking, limiting alcohol, and getting enough sleep.  Talk with your health care provider or a dietitian for guidance about healthy eating and healthy lifestyle choices.  This information is not intended to replace advice given to you by your health care provider. Make sure you discuss any  questions you have with your health care provider.  Document Revised: 07/15/2022 Document Reviewed: 07/15/2022  Elsevier Patient Education © 2022 Elsevier Inc.

## 2024-03-06 ENCOUNTER — PATIENT MESSAGE (OUTPATIENT)
Dept: FAMILY MEDICINE CLINIC | Facility: CLINIC | Age: 69
End: 2024-03-06
Payer: MEDICARE

## 2024-03-07 RX ORDER — AMOXICILLIN 500 MG/1
500 TABLET, FILM COATED ORAL 3 TIMES DAILY
Qty: 30 TABLET | Refills: 0 | Status: SHIPPED | OUTPATIENT
Start: 2024-03-07

## 2024-12-24 DIAGNOSIS — F41.9 ANXIETY: ICD-10-CM

## 2024-12-24 RX ORDER — ESCITALOPRAM OXALATE 10 MG/1
15 TABLET ORAL DAILY
Qty: 135 TABLET | Refills: 0 | OUTPATIENT
Start: 2024-12-24

## 2024-12-26 DIAGNOSIS — F41.9 ANXIETY: ICD-10-CM

## 2024-12-26 RX ORDER — ESCITALOPRAM OXALATE 10 MG/1
15 TABLET ORAL DAILY
Qty: 135 TABLET | Refills: 3 | Status: SHIPPED | OUTPATIENT
Start: 2024-12-26 | End: 2025-12-21

## 2024-12-26 NOTE — TELEPHONE ENCOUNTER
Caller: ISAIAS NIELSEN    Relationship: Emergency Contact    Best call back number: [phone]    Requested Prescriptions:   Requested Prescriptions     Pending Prescriptions Disp Refills    escitalopram (LEXAPRO) 10 MG tablet 135 tablet 3     Sig: Take 1.5 tablets by mouth Daily for 360 days.        Pharmacy where request should be sent: [facility] [address] [place] [address] [address] - [phone]  - [phone]     Last office visit with prescribing clinician: 12/21/2023   Last telemedicine visit with prescribing clinician: Visit date not found   Next office visit with prescribing clinician: 1/3/2025     Additional details provided by patient:     Does the patient have less than a 3 day supply:  [x] Yes  [] No      Oscar Hall Rep   12/26/24 11:41 EST

## 2025-01-03 ENCOUNTER — OFFICE VISIT (OUTPATIENT)
Dept: FAMILY MEDICINE CLINIC | Facility: CLINIC | Age: 70
End: 2025-01-03
Payer: MEDICARE

## 2025-01-03 VITALS
SYSTOLIC BLOOD PRESSURE: 124 MMHG | RESPIRATION RATE: 20 BRPM | WEIGHT: 153 LBS | HEART RATE: 86 BPM | DIASTOLIC BLOOD PRESSURE: 84 MMHG | OXYGEN SATURATION: 97 % | HEIGHT: 65 IN | BODY MASS INDEX: 25.49 KG/M2

## 2025-01-03 DIAGNOSIS — I10 HYPERTENSION, BENIGN: Primary | ICD-10-CM

## 2025-01-03 DIAGNOSIS — E78.2 MIXED HYPERLIPIDEMIA: ICD-10-CM

## 2025-01-03 PROCEDURE — 3079F DIAST BP 80-89 MM HG: CPT | Performed by: FAMILY MEDICINE

## 2025-01-03 PROCEDURE — 1111F DSCHRG MED/CURRENT MED MERGE: CPT | Performed by: FAMILY MEDICINE

## 2025-01-03 PROCEDURE — 99214 OFFICE O/P EST MOD 30 MIN: CPT | Performed by: FAMILY MEDICINE

## 2025-01-03 PROCEDURE — G2211 COMPLEX E/M VISIT ADD ON: HCPCS | Performed by: FAMILY MEDICINE

## 2025-01-03 PROCEDURE — 1126F AMNT PAIN NOTED NONE PRSNT: CPT | Performed by: FAMILY MEDICINE

## 2025-01-03 PROCEDURE — 3074F SYST BP LT 130 MM HG: CPT | Performed by: FAMILY MEDICINE

## 2025-01-03 NOTE — PROGRESS NOTES
Subjective   Savannah Bruner is a 69 y.o. female.   Chief Complaint   Patient presents with    Anxiety       History of Present Illness  Here for follow up anxiety. Lexapro 15 mg equivocally effective.  Due for labs and RHM    Because of medication refill  Symptoms include: joint pain, joint swelling and neck pain.   Pertinent negative symptoms include no abdominal pain, no anorexia, no change in stool, no chest pain, no chills, no congestion, no cough, no diaphoresis, no fatigue, no fever, no headaches, no myalgias, no nausea, no numbness, no rash, no sore throat, no swollen glands, no dysuria, no vertigo, no visual change, no vomiting and no weakness.        The following portions of the patient's history were reviewed and updated as appropriate: allergies, current medications, past family history, past medical history, past social history, past surgical history, and problem list.    Patient Active Problem List   Diagnosis    Polyarthropathy or polyarthritis    Anxiety    Chronic angle-closure glaucoma    Depressive disorder    Hyperlipidemia    Hypertension, benign    Primary osteoarthritis of right knee    Overweight (BMI 25.0-29.9)    History of total right hip replacement       Current Outpatient Medications on File Prior to Visit   Medication Sig Dispense Refill    escitalopram (LEXAPRO) 10 MG tablet Take 1.5 tablets by mouth Daily for 360 days. 135 tablet 3    Ferrous Sulfate (IRON) 28 MG tablet Take  by mouth Daily.      latanoprost (XALATAN) 0.005 % ophthalmic solution INSTILL 1 DROP INTO EACH EYE AT BEDTIME      [DISCONTINUED] amoxicillin (AMOXIL) 500 MG tablet Take 1 tablet by mouth 3 times a day. 30 tablet 0     No current facility-administered medications on file prior to visit.     Current outpatient and discharge medications have been reconciled for the patient.  Reviewed by: Jay Durán MD      Allergies   Allergen Reactions    Latex Itching     Hands itch not diagnosed       Review of  "Systems   Constitutional:  Negative for chills, diaphoresis, fatigue and fever.   HENT:  Negative for congestion, sore throat and swollen glands.    Respiratory:  Negative for cough.    Cardiovascular:  Negative for chest pain.   Gastrointestinal:  Negative for abdominal pain, anorexia, nausea and vomiting.   Genitourinary:  Negative for dysuria.   Musculoskeletal:  Positive for joint pain and neck pain. Negative for myalgias.   Skin:  Negative for rash.   Neurological:  Negative for vertigo, weakness and numbness.   Psychiatric/Behavioral:  The patient is nervous/anxious.      I have reviewed and confirmed the accuracy of the ROS as documented by the MA/LPN/RN Jay Durán MD    Objective   Visit Vitals  /84 (BP Location: Right arm, Patient Position: Sitting, Cuff Size: Adult)   Pulse 86   Resp 20   Ht 165.1 cm (65\")   Wt 69.4 kg (153 lb)   SpO2 97%   BMI 25.46 kg/m²      **  Physical Exam  Vitals reviewed.   Constitutional:       Appearance: She is well-developed.   HENT:      Head: Normocephalic.      Right Ear: External ear normal.      Left Ear: External ear normal.      Nose: Nose normal.   Eyes:      Conjunctiva/sclera: Conjunctivae normal.   Cardiovascular:      Rate and Rhythm: Normal rate.   Pulmonary:      Effort: Pulmonary effort is normal. No respiratory distress.   Abdominal:      General: Abdomen is flat.   Musculoskeletal:         General: No signs of injury.      Cervical back: Normal range of motion.   Skin:     Findings: No rash.   Neurological:      Mental Status: She is alert and oriented to person, place, and time.   Psychiatric:         Behavior: Behavior normal.         Thought Content: Thought content normal.         Judgment: Judgment normal.       Derm Physical Exam  Ortho Exam   Neurological Exam  Mental Status  Alert. Oriented to person, place, and time.         Assessment & Plan  Hypertension, benign  Stable, continue current management.     Orders:    CBC & " Differential    Comprehensive Metabolic Panel    Mixed hyperlipidemia   Unknown control, recheck     Orders:    Comprehensive Metabolic Panel    TSH    Lipid Panel With / Chol / HDL Ratio     Findings discussed. All questions answered.  Medication and medication adverse effects discussed.  Drug education given and explained to patient. Patient verbalized understanding.   Try increasing lexapro to 20 mg qd (take 2 of what she has at home) and call in about 3-4 weeks. May need new rx   Follow up in 3 months for recheck, sooner for concerns.  Follow-up for routine health maintenance as indicated.  Recommended follow-up for full physical examination and routine health maintanence.   BMI is within normal parameters. No other follow-up for BMI required.      Expected course, medications, and adverse effects discussed as appropriate.  Call or return if worsening or persistent symptoms.     This document is intended for medical professional use only.   Electronically signed by Jay Durán MD on 01/03/2025. This content may not have been proofread.

## 2025-01-03 NOTE — ASSESSMENT & PLAN NOTE
Stable, continue current management.     Orders:    CBC & Differential    Comprehensive Metabolic Panel

## 2025-01-03 NOTE — ASSESSMENT & PLAN NOTE
Unknown control, recheck     Orders:    Comprehensive Metabolic Panel    TSH    Lipid Panel With / Chol / HDL Ratio

## 2025-01-19 LAB
ALBUMIN SERPL-MCNC: 4.5 G/DL (ref 3.9–4.9)
ALP SERPL-CCNC: 78 IU/L (ref 44–121)
ALT SERPL-CCNC: 10 IU/L (ref 0–32)
AST SERPL-CCNC: 14 IU/L (ref 0–40)
BASOPHILS # BLD AUTO: 0 X10E3/UL (ref 0–0.2)
BASOPHILS NFR BLD AUTO: 1 %
BILIRUB SERPL-MCNC: 0.6 MG/DL (ref 0–1.2)
BUN SERPL-MCNC: 15 MG/DL (ref 8–27)
BUN/CREAT SERPL: 14 (ref 12–28)
CALCIUM SERPL-MCNC: 10.1 MG/DL (ref 8.7–10.3)
CHLORIDE SERPL-SCNC: 103 MMOL/L (ref 96–106)
CHOLEST SERPL-MCNC: 235 MG/DL (ref 100–199)
CHOLEST/HDLC SERPL: 4.2 RATIO (ref 0–4.4)
CO2 SERPL-SCNC: 27 MMOL/L (ref 20–29)
CREAT SERPL-MCNC: 1.08 MG/DL (ref 0.57–1)
EGFRCR SERPLBLD CKD-EPI 2021: 56 ML/MIN/1.73
EOSINOPHIL # BLD AUTO: 0.1 X10E3/UL (ref 0–0.4)
EOSINOPHIL NFR BLD AUTO: 2 %
ERYTHROCYTE [DISTWIDTH] IN BLOOD BY AUTOMATED COUNT: 12.1 % (ref 11.7–15.4)
GLOBULIN SER CALC-MCNC: 2.5 G/DL (ref 1.5–4.5)
GLUCOSE SERPL-MCNC: 104 MG/DL (ref 70–99)
HCT VFR BLD AUTO: 37.4 % (ref 34–46.6)
HDLC SERPL-MCNC: 56 MG/DL
HGB BLD-MCNC: 12.3 G/DL (ref 11.1–15.9)
IMM GRANULOCYTES # BLD AUTO: 0 X10E3/UL (ref 0–0.1)
IMM GRANULOCYTES NFR BLD AUTO: 0 %
LDLC SERPL CALC-MCNC: 147 MG/DL (ref 0–99)
LYMPHOCYTES # BLD AUTO: 1.1 X10E3/UL (ref 0.7–3.1)
LYMPHOCYTES NFR BLD AUTO: 20 %
MCH RBC QN AUTO: 29.9 PG (ref 26.6–33)
MCHC RBC AUTO-ENTMCNC: 32.9 G/DL (ref 31.5–35.7)
MCV RBC AUTO: 91 FL (ref 79–97)
MONOCYTES # BLD AUTO: 0.5 X10E3/UL (ref 0.1–0.9)
MONOCYTES NFR BLD AUTO: 8 %
NEUTROPHILS # BLD AUTO: 4.1 X10E3/UL (ref 1.4–7)
NEUTROPHILS NFR BLD AUTO: 69 %
PLATELET # BLD AUTO: 216 X10E3/UL (ref 150–450)
POTASSIUM SERPL-SCNC: 4.4 MMOL/L (ref 3.5–5.2)
PROT SERPL-MCNC: 7 G/DL (ref 6–8.5)
RBC # BLD AUTO: 4.11 X10E6/UL (ref 3.77–5.28)
SODIUM SERPL-SCNC: 143 MMOL/L (ref 134–144)
TRIGL SERPL-MCNC: 176 MG/DL (ref 0–149)
TSH SERPL DL<=0.005 MIU/L-ACNC: 1.99 UIU/ML (ref 0.45–4.5)
VLDLC SERPL CALC-MCNC: 32 MG/DL (ref 5–40)
WBC # BLD AUTO: 5.8 X10E3/UL (ref 3.4–10.8)

## 2025-03-10 ENCOUNTER — PATIENT MESSAGE (OUTPATIENT)
Dept: FAMILY MEDICINE CLINIC | Facility: CLINIC | Age: 70
End: 2025-03-10
Payer: MEDICARE

## (undated) DEVICE — SUT VIC 2/0 CT2 27IN J269H

## (undated) DEVICE — ZIPPERED TOGA, 2X LARGE: Brand: FLYTE

## (undated) DEVICE — DUAL CUT SAGITTAL BLADE

## (undated) DEVICE — SUT VIC 1 CT1 36IN J947H

## (undated) DEVICE — C-ARM: Brand: UNBRANDED

## (undated) DEVICE — SKIN AFFIX SURG ADHESIVE 72/CS 0.55ML: Brand: MEDLINE

## (undated) DEVICE — GOWN,REINFORCE,POLY,SIRUS,BREATH SLV,LG: Brand: MEDLINE

## (undated) DEVICE — SUT NONABS MAXBRAID/PE NMBR2 C7 38IN BLU 900335
Type: IMPLANTABLE DEVICE | Status: NON-FUNCTIONAL
Removed: 2019-08-21

## (undated) DEVICE — PK PROC TURNOVER

## (undated) DEVICE — PK TOTL HIP 50

## (undated) DEVICE — DRAPE,U/ SHT,SPLIT,PLAS,STERIL: Brand: MEDLINE

## (undated) DEVICE — INTEGUSEAL MICROBIAL SEALANT: Brand: AVANOS

## (undated) DEVICE — 1010 S-DRAPE TOWEL DRAPE 10/BX: Brand: STERI-DRAPE™

## (undated) DEVICE — NDL SPINE 18G 31/2IN PNK

## (undated) DEVICE — SOL IRRIG H2O 1000ML STRL

## (undated) DEVICE — KT PT POSITION SUPINE HANA/PROFX TABL

## (undated) DEVICE — DRSNG SURG AQUACEL AG 9X25CM

## (undated) DEVICE — SOL IRRIG SOD CHL 0.9PCT 3000ML

## (undated) DEVICE — IRRIGATOR BULB ASEPTO 60CC STRL

## (undated) DEVICE — PAD CAST SYN PROTOUCH RL 6IN NS